# Patient Record
Sex: MALE | Race: WHITE | NOT HISPANIC OR LATINO
[De-identification: names, ages, dates, MRNs, and addresses within clinical notes are randomized per-mention and may not be internally consistent; named-entity substitution may affect disease eponyms.]

---

## 2020-05-21 ENCOUNTER — TRANSCRIPTION ENCOUNTER (OUTPATIENT)
Age: 55
End: 2020-05-21

## 2020-08-15 ENCOUNTER — TRANSCRIPTION ENCOUNTER (OUTPATIENT)
Age: 55
End: 2020-08-15

## 2020-12-01 ENCOUNTER — TRANSCRIPTION ENCOUNTER (OUTPATIENT)
Age: 55
End: 2020-12-01

## 2020-12-01 ENCOUNTER — INPATIENT (INPATIENT)
Facility: HOSPITAL | Age: 55
LOS: 6 days | Discharge: HOME | DRG: 273 | End: 2020-12-08
Attending: INTERNAL MEDICINE | Admitting: INTERNAL MEDICINE
Payer: SELF-PAY

## 2020-12-01 VITALS
OXYGEN SATURATION: 95 % | SYSTOLIC BLOOD PRESSURE: 122 MMHG | TEMPERATURE: 98 F | DIASTOLIC BLOOD PRESSURE: 86 MMHG | HEART RATE: 156 BPM | HEIGHT: 74 IN | WEIGHT: 220.02 LBS | RESPIRATION RATE: 18 BRPM

## 2020-12-01 DIAGNOSIS — I48.91 UNSPECIFIED ATRIAL FIBRILLATION: ICD-10-CM

## 2020-12-01 DIAGNOSIS — R09.89 OTHER SPECIFIED SYMPTOMS AND SIGNS INVOLVING THE CIRCULATORY AND RESPIRATORY SYSTEMS: ICD-10-CM

## 2020-12-01 LAB
ALBUMIN SERPL ELPH-MCNC: 3.7 G/DL — SIGNIFICANT CHANGE UP (ref 3.3–5)
ALP SERPL-CCNC: 57 U/L — SIGNIFICANT CHANGE UP (ref 30–120)
ALT FLD-CCNC: 114 U/L DA — HIGH (ref 10–60)
ANION GAP SERPL CALC-SCNC: 11 MMOL/L — SIGNIFICANT CHANGE UP (ref 5–17)
APTT BLD: 28 SEC — SIGNIFICANT CHANGE UP (ref 27.5–35.5)
AST SERPL-CCNC: 58 U/L — HIGH (ref 10–40)
BASOPHILS # BLD AUTO: 0.09 K/UL — SIGNIFICANT CHANGE UP (ref 0–0.2)
BASOPHILS NFR BLD AUTO: 1.2 % — SIGNIFICANT CHANGE UP (ref 0–2)
BILIRUB SERPL-MCNC: 0.4 MG/DL — SIGNIFICANT CHANGE UP (ref 0.2–1.2)
BUN SERPL-MCNC: 20 MG/DL — SIGNIFICANT CHANGE UP (ref 7–23)
CALCIUM SERPL-MCNC: 8.5 MG/DL — SIGNIFICANT CHANGE UP (ref 8.4–10.5)
CHLORIDE SERPL-SCNC: 109 MMOL/L — HIGH (ref 96–108)
CK SERPL-CCNC: 147 U/L — SIGNIFICANT CHANGE UP (ref 39–308)
CO2 SERPL-SCNC: 22 MMOL/L — SIGNIFICANT CHANGE UP (ref 22–31)
CREAT SERPL-MCNC: 1.3 MG/DL — SIGNIFICANT CHANGE UP (ref 0.5–1.3)
D DIMER BLD IA.RAPID-MCNC: 309 NG/ML DDU — HIGH
EOSINOPHIL # BLD AUTO: 0.12 K/UL — SIGNIFICANT CHANGE UP (ref 0–0.5)
EOSINOPHIL NFR BLD AUTO: 1.6 % — SIGNIFICANT CHANGE UP (ref 0–6)
GLUCOSE SERPL-MCNC: 118 MG/DL — HIGH (ref 70–99)
HCT VFR BLD CALC: 45.9 % — SIGNIFICANT CHANGE UP (ref 39–50)
HGB BLD-MCNC: 15.6 G/DL — SIGNIFICANT CHANGE UP (ref 13–17)
IMM GRANULOCYTES NFR BLD AUTO: 0.3 % — SIGNIFICANT CHANGE UP (ref 0–1.5)
INR BLD: 1.15 RATIO — SIGNIFICANT CHANGE UP (ref 0.88–1.16)
LYMPHOCYTES # BLD AUTO: 2.39 K/UL — SIGNIFICANT CHANGE UP (ref 1–3.3)
LYMPHOCYTES # BLD AUTO: 31.4 % — SIGNIFICANT CHANGE UP (ref 13–44)
MCHC RBC-ENTMCNC: 29.3 PG — SIGNIFICANT CHANGE UP (ref 27–34)
MCHC RBC-ENTMCNC: 34 GM/DL — SIGNIFICANT CHANGE UP (ref 32–36)
MCV RBC AUTO: 86.1 FL — SIGNIFICANT CHANGE UP (ref 80–100)
MONOCYTES # BLD AUTO: 0.69 K/UL — SIGNIFICANT CHANGE UP (ref 0–0.9)
MONOCYTES NFR BLD AUTO: 9.1 % — SIGNIFICANT CHANGE UP (ref 2–14)
NEUTROPHILS # BLD AUTO: 4.3 K/UL — SIGNIFICANT CHANGE UP (ref 1.8–7.4)
NEUTROPHILS NFR BLD AUTO: 56.4 % — SIGNIFICANT CHANGE UP (ref 43–77)
NRBC # BLD: 0 /100 WBCS — SIGNIFICANT CHANGE UP (ref 0–0)
NT-PROBNP SERPL-SCNC: 2026 PG/ML — HIGH (ref 0–125)
PLATELET # BLD AUTO: 232 K/UL — SIGNIFICANT CHANGE UP (ref 150–400)
POTASSIUM SERPL-MCNC: 4.7 MMOL/L — SIGNIFICANT CHANGE UP (ref 3.5–5.3)
POTASSIUM SERPL-SCNC: 4.7 MMOL/L — SIGNIFICANT CHANGE UP (ref 3.5–5.3)
PROT SERPL-MCNC: 7 G/DL — SIGNIFICANT CHANGE UP (ref 6–8.3)
PROTHROM AB SERPL-ACNC: 13.8 SEC — HIGH (ref 10.6–13.6)
RBC # BLD: 5.33 M/UL — SIGNIFICANT CHANGE UP (ref 4.2–5.8)
RBC # FLD: 13.1 % — SIGNIFICANT CHANGE UP (ref 10.3–14.5)
SARS-COV-2 RNA SPEC QL NAA+PROBE: SIGNIFICANT CHANGE UP
SODIUM SERPL-SCNC: 142 MMOL/L — SIGNIFICANT CHANGE UP (ref 135–145)
TROPONIN I SERPL-MCNC: 0.02 NG/ML — SIGNIFICANT CHANGE UP (ref 0.02–0.06)
WBC # BLD: 7.61 K/UL — SIGNIFICANT CHANGE UP (ref 3.8–10.5)
WBC # FLD AUTO: 7.61 K/UL — SIGNIFICANT CHANGE UP (ref 3.8–10.5)

## 2020-12-01 PROCEDURE — 93010 ELECTROCARDIOGRAM REPORT: CPT

## 2020-12-01 PROCEDURE — 99285 EMERGENCY DEPT VISIT HI MDM: CPT

## 2020-12-01 PROCEDURE — 93306 TTE W/DOPPLER COMPLETE: CPT | Mod: 26

## 2020-12-01 PROCEDURE — 71045 X-RAY EXAM CHEST 1 VIEW: CPT | Mod: 26

## 2020-12-01 PROCEDURE — 71275 CT ANGIOGRAPHY CHEST: CPT | Mod: 26

## 2020-12-01 RX ORDER — FUROSEMIDE 40 MG
40 TABLET ORAL ONCE
Refills: 0 | Status: COMPLETED | OUTPATIENT
Start: 2020-12-01 | End: 2020-12-01

## 2020-12-01 RX ORDER — METOPROLOL TARTRATE 50 MG
25 TABLET ORAL EVERY 6 HOURS
Refills: 0 | Status: DISCONTINUED | OUTPATIENT
Start: 2020-12-01 | End: 2020-12-02

## 2020-12-01 RX ORDER — DILTIAZEM HCL 120 MG
20 CAPSULE, EXT RELEASE 24 HR ORAL ONCE
Refills: 0 | Status: COMPLETED | OUTPATIENT
Start: 2020-12-01 | End: 2020-12-01

## 2020-12-01 RX ORDER — ENOXAPARIN SODIUM 100 MG/ML
100 INJECTION SUBCUTANEOUS EVERY 12 HOURS
Refills: 0 | Status: DISCONTINUED | OUTPATIENT
Start: 2020-12-02 | End: 2020-12-02

## 2020-12-01 RX ORDER — DILTIAZEM HCL 120 MG
10 CAPSULE, EXT RELEASE 24 HR ORAL
Qty: 125 | Refills: 0 | Status: DISCONTINUED | OUTPATIENT
Start: 2020-12-01 | End: 2020-12-01

## 2020-12-01 RX ORDER — DILTIAZEM HCL 120 MG
60 CAPSULE, EXT RELEASE 24 HR ORAL EVERY 6 HOURS
Refills: 0 | Status: DISCONTINUED | OUTPATIENT
Start: 2020-12-01 | End: 2020-12-01

## 2020-12-01 RX ORDER — INFLUENZA VIRUS VACCINE 15; 15; 15; 15 UG/.5ML; UG/.5ML; UG/.5ML; UG/.5ML
0.5 SUSPENSION INTRAMUSCULAR ONCE
Refills: 0 | Status: DISCONTINUED | OUTPATIENT
Start: 2020-12-01 | End: 2020-12-02

## 2020-12-01 RX ORDER — ENOXAPARIN SODIUM 100 MG/ML
40 INJECTION SUBCUTANEOUS DAILY
Refills: 0 | Status: DISCONTINUED | OUTPATIENT
Start: 2020-12-01 | End: 2020-12-01

## 2020-12-01 RX ORDER — SODIUM CHLORIDE 9 MG/ML
1000 INJECTION INTRAMUSCULAR; INTRAVENOUS; SUBCUTANEOUS ONCE
Refills: 0 | Status: COMPLETED | OUTPATIENT
Start: 2020-12-01 | End: 2020-12-01

## 2020-12-01 RX ORDER — ENOXAPARIN SODIUM 100 MG/ML
100 INJECTION SUBCUTANEOUS ONCE
Refills: 0 | Status: COMPLETED | OUTPATIENT
Start: 2020-12-01 | End: 2020-12-01

## 2020-12-01 RX ORDER — DILTIAZEM HCL 120 MG
10 CAPSULE, EXT RELEASE 24 HR ORAL ONCE
Refills: 0 | Status: COMPLETED | OUTPATIENT
Start: 2020-12-01 | End: 2020-12-01

## 2020-12-01 RX ORDER — LANOLIN ALCOHOL/MO/W.PET/CERES
5 CREAM (GRAM) TOPICAL ONCE
Refills: 0 | Status: COMPLETED | OUTPATIENT
Start: 2020-12-01 | End: 2020-12-01

## 2020-12-01 RX ORDER — DILTIAZEM HCL 120 MG
10 CAPSULE, EXT RELEASE 24 HR ORAL
Qty: 125 | Refills: 0 | Status: DISCONTINUED | OUTPATIENT
Start: 2020-12-01 | End: 2020-12-02

## 2020-12-01 RX ADMIN — Medication 10 MG/HR: at 16:56

## 2020-12-01 RX ADMIN — SODIUM CHLORIDE 1000 MILLILITER(S): 9 INJECTION INTRAMUSCULAR; INTRAVENOUS; SUBCUTANEOUS at 17:09

## 2020-12-01 RX ADMIN — Medication 40 MILLIGRAM(S): at 17:41

## 2020-12-01 RX ADMIN — ENOXAPARIN SODIUM 100 MILLIGRAM(S): 100 INJECTION SUBCUTANEOUS at 17:19

## 2020-12-01 RX ADMIN — Medication 5 MILLIGRAM(S): at 21:31

## 2020-12-01 RX ADMIN — Medication 20 MILLIGRAM(S): at 16:49

## 2020-12-01 RX ADMIN — Medication 60 MILLIGRAM(S): at 17:18

## 2020-12-01 RX ADMIN — Medication 10 MILLIGRAM(S): at 16:25

## 2020-12-01 RX ADMIN — SODIUM CHLORIDE 1000 MILLILITER(S): 9 INJECTION INTRAMUSCULAR; INTRAVENOUS; SUBCUTANEOUS at 17:23

## 2020-12-01 RX ADMIN — Medication 10 MG/HR: at 17:27

## 2020-12-01 RX ADMIN — Medication 10 MG/HR: at 17:25

## 2020-12-01 NOTE — ED ADULT NURSE NOTE - CHPI ED NUR SYMPTOMS NEG
no fever/no chest pain/no nausea/no dizziness/no vomiting/no syncope/no back pain/no chills/no diaphoresis/no congestion

## 2020-12-01 NOTE — ED PROVIDER NOTE - PROGRESS NOTE DETAILS
Dw Dr CARMEN Camacho - will see pt to admit, pending covid Dw Dr OCTAVIA Camacho - pt no longer sees Dr Calderon - admit to on-call md Pt seen by Dr ZEENAT Taylor - giving Lovenox, admit to tele Bruce ruiz - agree with lasix for CXR findings and borderline o2 sat. Will check CTA.   Bruce Block- will see pt to admit, pending covid Bruce Block- will see pt to admit (CTA res, covid neg). Bruce Taylor re all results as well

## 2020-12-01 NOTE — ED ADULT NURSE REASSESSMENT NOTE - NS ED NURSE REASSESS COMMENT FT1
telephone report given to DANIEL Rodriguez on 1E
received report and assumed care of pt at change of shift. pt awake and alert. denies pain and discomfort presently. cm='s - 140's. pt maintained on Cardizem drip as ordered. call placed to Cardiologist Dr. Taylor; informed of heart rate. no new orders; continue treatment; admit to tele. voiding large amounts yellow urine in urinal. will continue to monitor

## 2020-12-01 NOTE — CONSULT NOTE ADULT - ASSESSMENT
The patient is a 55 year old male with no past medical history who presents with shortness of breath in the setting of rapid atrial fibrillation.    Plan:  - Received a total of 30 mg IV of diltiazem  - Heart rates improved but still remains tachycardic 100-120  - Start diltiazem drip at 10 mg/hr  - Start diltiazem 60 mg PO q6h  - [id number] of 0 so will likely not need long term anticoagulation. Will give a one time dose of enoxaparin 100 mg until more data is obtained.  - Check TSH in am  - Check echocardiogram  - Monitor on telemetry The patient is a 55 year old male with no past medical history who presents with shortness of breath in the setting of rapid atrial fibrillation.    Plan:  - Received a total of 30 mg IV of diltiazem  - Heart rates improved but still remains tachycardic 100-120  - Start diltiazem drip at 10 mg/hr  - Start diltiazem 60 mg PO q6h  - [id number] of 0 so will likely not need long term anticoagulation. Will give a one time dose of enoxaparin 100 mg until more data is obtained.  - Check TSH in am  - Check echocardiogram  - D-dimer mildly elevated; check CTA chest  - CXR with pulm edema  - Hold additional IV fluids  - Check BNP  - Cardiac enzymes negative  - May need diuresis  - Monitor on telemetry The patient is a 55 year old male with no past medical history who presents with shortness of breath in the setting of rapid atrial fibrillation.    Plan:  - Received a total of 30 mg IV of diltiazem  - Heart rates improved but still remains tachycardic 100-120  - Start diltiazem drip at 10 mg/hr  - Start diltiazem 60 mg PO q6h  - [id number] of 0 so will likely not need long term anticoagulation. Will give a one time dose of enoxaparin 100 mg until more data is obtained.  - Check TSH in am  - Check echocardiogram  - D-dimer mildly elevated; check CTA chest  - CXR with pulm edema  - Check BNP  - Cardiac enzymes negative  - Give furosemide 40 mg IV now and assess in am the need for additional diuretics  - Monitor on telemetry

## 2020-12-01 NOTE — CONSULT NOTE ADULT - SUBJECTIVE AND OBJECTIVE BOX
History of Present Illness: The patient is a 55 year old male with no past medical history who presents with shortness of breath. He states for at least the past week, he has had shortness of breath, especially when lying down. It is not as noticeable when he is exerting himself. He has also noted bloating and nausea when lying down. He denies significant palpitations, dizziness, or chest pain. His alcohol and caffeine intake has increased over the past few months. No prior cardiac testing. He initially when to urgent care where he received adenosine 6 mg, 12 mg, and 12 mg without effect.    Past Medical/Surgical History:  None    Medications:  None    Family History: Non-contributory family history of premature cardiovascular atherosclerotic disease    Social History: See HPI    Review of Systems:  General: No fevers, chills, weight loss or gain  Skin: No rashes, color changes  Cardiovascular: No chest pain, orthopnea  Respiratory: No shortness of breath, cough  Gastrointestinal: (+) nausea, abdominal pain  Genitourinary: No incontinence, pain with urination  Musculoskeletal: No pain, swelling, decreased range of motion  Neurological: No headache, weakness  Psychiatric: No depression, anxiety  Endocrine: No weight loss or gain, increased thirst  All other systems are comprehensively negative.    Physical Exam:  Vitals:        Vital Signs Last 24 Hrs  T(C): 36.9 (01 Dec 2020 16:20), Max: 36.9 (01 Dec 2020 16:20)  T(F): 98.5 (01 Dec 2020 16:20), Max: 98.5 (01 Dec 2020 16:20)  HR: 109 (01 Dec 2020 17:05) (109 - 156)  BP: 147/111 (01 Dec 2020 17:05) (111/86 - 147/111)  BP(mean): --  RR: 93 (01 Dec 2020 16:36) (18 - 93)  SpO2: 95% (01 Dec 2020 16:20) (95% - 95%)  General: NAD  HEENT: MMM  Neck: No JVD, no carotid bruit  Lungs: CTAB  CV: Irregular, nl S1/S2, no M/R/G  Abdomen: S/NT/ND, +BS  Extremities: No LE edema, no cyanosis  Neuro: AAOx3, non-focal  Skin: No rash    Labs:                        15.6   7.61  )-----------( 232      ( 01 Dec 2020 16:44 )             45.9     12-01    142  |  109<H>  |  20  ----------------------------<  118<H>  4.7   |  22  |  1.30    Ca    8.5      01 Dec 2020 16:44          PT/INR - ( 01 Dec 2020 16:44 )   PT: 13.8 sec;   INR: 1.15 ratio         PTT - ( 01 Dec 2020 16:44 )  PTT:28.0 sec    ECG: AF, normal axis, nonspecific ST abnormality

## 2020-12-01 NOTE — ED PROVIDER NOTE - CARE PLAN
Principal Discharge DX:	New onset atrial fibrillation   Principal Discharge DX:	New onset atrial fibrillation  Secondary Diagnosis:	Pulmonary congestion

## 2020-12-01 NOTE — ED PROVIDER NOTE - OBJECTIVE STATEMENT
54 y/o male with no pertinent PMHx presents to the ED BIBEMS from Urgent Care c/o SOB x few days. Pt states he started having difficulty breathing on 11/30 night with associated nausea. Pt went to Urgent Care today, EKG showed to have rapid heart beat. Per EMS, urgent care treated pt as SVT, pt given 6-12-12 Adenosine en route to [facility] ED. Pt denies palpitations, coughs, congestion, fevers, peripheral edema. No known COVID contacts, pt does not smoke or take any illicit drugs. No other complaints at this time. NKDA. PCP: Thomas Calderon 54 y/o male with no pertinent PMHx presents to the ED BIBEMS from Urgent Care c/o SOB x few days. Pt states he started having difficulty breathing on 11/30 night.  no chest pains. Pt with persistent mild dyspnea x past ~ 8d. Pt went to Urgent Care today, EKG showed to have rapid heart beat. Per EMS, urgent care treated pt as SVT, pt given 6-12-12 Adenosine with no resolutoin of sx. Pt with  mild palpitations last 2 d. NO cough , congestion, fevers, peripheral edema. No known COVID contacts / prior infxn . pt does not smoke or take any illicit drugs. No other complaints at this time. NKDA. PCP: Thomas Calderon (last seen 8 yrs ago) 56 y/o male with no pertinent PMHx presents to the ED BIBEMS from Urgent Care c/o SOB x few days. Pt states he started having difficulty breathing ~ 11/23 night.  no chest pains. Pt with persistent mild dyspnea x past ~ 8d. Pt went to Urgent Care today, EKG showed to have rapid heart beat. Per EMS, urgent care treated pt as SVT, pt given 6-12-12 Adenosine with no resolutoin of sx. Pt with  mild palpitations last 2 d. NO cough , congestion, fevers, peripheral edema. No known COVID contacts / prior infxn . pt does not smoke or take any illicit drugs. No other complaints at this time. NKDA. PCP: Thomas Calderon (last seen 8 yrs ago)

## 2020-12-01 NOTE — H&P ADULT - HISTORY OF PRESENT ILLNESS
56 y/o male with no pertinent PMHx presents to the ED BIBEMS from Urgent Care c/o SOB x few days. Pt states he started having difficulty breathing ~ 11/23 night.  no chest pains. Pt with persistent mild dyspnea x past ~ 8d. Pt went to Urgent Care today, EKG showed to have rapid heart beat. Per EMS, urgent care treated pt as SVT, pt given 6-12-12 Adenosine with no resolutoin of sx. Pt with  mild palpitations last 2 d. NO cough , congestion, fevers, peripheral edema.he says for last 1 year he had irregular heart beat at times but sought no TT for it no w up done. No known COVID contacts / prior infxn . pt does not smoke or take any illicit drugs. No other complaints at this time. NKDA. PCP: Kvng gunter last seen 8 yrs back  patient states that lately he has started drinking alcohol a lot and takes lot of coffee. lives alone, has a business importing flowers, has one daughter who lives in city

## 2020-12-01 NOTE — ED ADULT TRIAGE NOTE - CHIEF COMPLAINT QUOTE
Ptient brought in by EMS from urgent care for shortness of breath for a few days. urgent care treated rapid heartrate as SVT and gave 6mg adenosine patient's HR went from 150s to 120s. Patient's HR went back up to 150s and urgent care gave a second dose of 12mg adenosine. EMS gave 3rd dose of 12mg adenosine in route to ED. Patient arrived with  direct bedded to trauma room for EKG and evaluation.

## 2020-12-01 NOTE — H&P ADULT - ASSESSMENT
56 y/o male with no pertinent PMHx presents to the ED BIBEMS from Urgent Care c/o SOB x few days. Pt states he started having difficulty breathing ~ 11/23 night.  no chest pains. Pt with persistent mild dyspnea x past ~ 8d. Pt went to Urgent Care today, EKG showed to have rapid heart beat. Per EMS, urgent care treated pt as SVT, pt given 6-12-12 Adenosine with no resolutoin of sx. Pt with  mild palpitations last 2 d. NO cough , congestion, fevers, peripheral edema.he says for last 1 year he had irregular heart beat at times but sought no TT for it no w up done. No known COVID contacts / prior infxn . pt does not smoke or take any illicit drugs. No other complaints at this time. NKDA. PCP: Kvng gunter last seen 8 yrs back  patient states that lately he has started drinking alcohol a lot and takes lot of coffee. lives alone, has a business importing flowers, has one daughter who lives in city  admitted for  ac respiratory distress   AFIB with RVR  chf  transaminitis  started on cardizem, cardio consult noted-tart diltiazem 60 mg PO q6h  - [id number] of 0 so will likely not need long term anticoagulation. Will give a one time dose of enoxaparin 100 mg until more data is obtained. D-dimer mildly elevated; check CTA chest-Neg for PE, b/l effusion with pulmonary edema  - CXR with pulm edema received lasix in ED

## 2020-12-01 NOTE — ED PROVIDER NOTE - CARDIAC, MLM
Regular rhythm.  Heart sounds S1, S2.  No murmurs, rubs or gallops. +tachycardic irregular rhythm.  Heart sounds S1, S2.  No murmurs, rubs or gallops. +tachycardic

## 2020-12-01 NOTE — ED ADULT NURSE NOTE - OBJECTIVE STATEMENT
Patient is a 55 year old male who came to the ED due to having a rapid heart rate in an urgent care center - patient states that he went to urgent care because he was feeling SOB since Monday.  Patient alert and orientated to person, place, and time; breathing unlabored but quick, patient has difficulty speaking in longer sentences; skin is warm and dry - color is good.

## 2020-12-02 LAB
ALBUMIN SERPL ELPH-MCNC: 3.5 G/DL — SIGNIFICANT CHANGE UP (ref 3.3–5)
ALP SERPL-CCNC: 56 U/L — SIGNIFICANT CHANGE UP (ref 30–120)
ALT FLD-CCNC: 92 U/L DA — HIGH (ref 10–60)
ANION GAP SERPL CALC-SCNC: 11 MMOL/L — SIGNIFICANT CHANGE UP (ref 5–17)
AST SERPL-CCNC: 43 U/L — HIGH (ref 10–40)
BASOPHILS # BLD AUTO: 0.11 K/UL — SIGNIFICANT CHANGE UP (ref 0–0.2)
BASOPHILS NFR BLD AUTO: 1.7 % — SIGNIFICANT CHANGE UP (ref 0–2)
BILIRUB SERPL-MCNC: 0.7 MG/DL — SIGNIFICANT CHANGE UP (ref 0.2–1.2)
BUN SERPL-MCNC: 20 MG/DL — SIGNIFICANT CHANGE UP (ref 7–23)
CALCIUM SERPL-MCNC: 8.6 MG/DL — SIGNIFICANT CHANGE UP (ref 8.4–10.5)
CHLORIDE SERPL-SCNC: 110 MMOL/L — HIGH (ref 96–108)
CHOLEST SERPL-MCNC: 137 MG/DL — SIGNIFICANT CHANGE UP
CO2 SERPL-SCNC: 22 MMOL/L — SIGNIFICANT CHANGE UP (ref 22–31)
CREAT SERPL-MCNC: 0.98 MG/DL — SIGNIFICANT CHANGE UP (ref 0.5–1.3)
EOSINOPHIL # BLD AUTO: 0.13 K/UL — SIGNIFICANT CHANGE UP (ref 0–0.5)
EOSINOPHIL NFR BLD AUTO: 2 % — SIGNIFICANT CHANGE UP (ref 0–6)
GLUCOSE SERPL-MCNC: 111 MG/DL — HIGH (ref 70–99)
HCT VFR BLD CALC: 46.5 % — SIGNIFICANT CHANGE UP (ref 39–50)
HDLC SERPL-MCNC: 44 MG/DL — SIGNIFICANT CHANGE UP
HGB BLD-MCNC: 15.9 G/DL — SIGNIFICANT CHANGE UP (ref 13–17)
IMM GRANULOCYTES NFR BLD AUTO: 0.2 % — SIGNIFICANT CHANGE UP (ref 0–1.5)
LIPID PNL WITH DIRECT LDL SERPL: 77 MG/DL — SIGNIFICANT CHANGE UP
LYMPHOCYTES # BLD AUTO: 1.91 K/UL — SIGNIFICANT CHANGE UP (ref 1–3.3)
LYMPHOCYTES # BLD AUTO: 29 % — SIGNIFICANT CHANGE UP (ref 13–44)
MCHC RBC-ENTMCNC: 29.2 PG — SIGNIFICANT CHANGE UP (ref 27–34)
MCHC RBC-ENTMCNC: 34.2 GM/DL — SIGNIFICANT CHANGE UP (ref 32–36)
MCV RBC AUTO: 85.5 FL — SIGNIFICANT CHANGE UP (ref 80–100)
MONOCYTES # BLD AUTO: 0.65 K/UL — SIGNIFICANT CHANGE UP (ref 0–0.9)
MONOCYTES NFR BLD AUTO: 9.9 % — SIGNIFICANT CHANGE UP (ref 2–14)
NEUTROPHILS # BLD AUTO: 3.77 K/UL — SIGNIFICANT CHANGE UP (ref 1.8–7.4)
NEUTROPHILS NFR BLD AUTO: 57.2 % — SIGNIFICANT CHANGE UP (ref 43–77)
NON HDL CHOLESTEROL: 93 MG/DL — SIGNIFICANT CHANGE UP
NRBC # BLD: 0 /100 WBCS — SIGNIFICANT CHANGE UP (ref 0–0)
NT-PROBNP SERPL-SCNC: 1347 PG/ML — HIGH (ref 0–125)
PLATELET # BLD AUTO: 198 K/UL — SIGNIFICANT CHANGE UP (ref 150–400)
POTASSIUM SERPL-MCNC: 3.8 MMOL/L — SIGNIFICANT CHANGE UP (ref 3.5–5.3)
POTASSIUM SERPL-SCNC: 3.8 MMOL/L — SIGNIFICANT CHANGE UP (ref 3.5–5.3)
PROT SERPL-MCNC: 6.8 G/DL — SIGNIFICANT CHANGE UP (ref 6–8.3)
RBC # BLD: 5.44 M/UL — SIGNIFICANT CHANGE UP (ref 4.2–5.8)
RBC # FLD: 13.1 % — SIGNIFICANT CHANGE UP (ref 10.3–14.5)
SODIUM SERPL-SCNC: 143 MMOL/L — SIGNIFICANT CHANGE UP (ref 135–145)
T3 SERPL-MCNC: 102 NG/DL — SIGNIFICANT CHANGE UP (ref 80–200)
T4 AB SER-ACNC: 6.9 UG/DL — SIGNIFICANT CHANGE UP (ref 4.6–12)
TRIGL SERPL-MCNC: 84 MG/DL — SIGNIFICANT CHANGE UP
TSH SERPL-MCNC: 2.04 UIU/ML — SIGNIFICANT CHANGE UP (ref 0.27–4.2)
TSH SERPL-MCNC: 2.19 UIU/ML — SIGNIFICANT CHANGE UP (ref 0.27–4.2)
WBC # BLD: 6.58 K/UL — SIGNIFICANT CHANGE UP (ref 3.8–10.5)
WBC # FLD AUTO: 6.58 K/UL — SIGNIFICANT CHANGE UP (ref 3.8–10.5)

## 2020-12-02 PROCEDURE — 93010 ELECTROCARDIOGRAM REPORT: CPT

## 2020-12-02 PROCEDURE — 99255 IP/OBS CONSLTJ NEW/EST HI 80: CPT

## 2020-12-02 PROCEDURE — 93456 R HRT CORONARY ARTERY ANGIO: CPT | Mod: 26

## 2020-12-02 PROCEDURE — 99152 MOD SED SAME PHYS/QHP 5/>YRS: CPT

## 2020-12-02 RX ORDER — FUROSEMIDE 40 MG
20 TABLET ORAL DAILY
Refills: 0 | Status: DISCONTINUED | OUTPATIENT
Start: 2020-12-02 | End: 2020-12-08

## 2020-12-02 RX ORDER — METOPROLOL TARTRATE 50 MG
50 TABLET ORAL EVERY 6 HOURS
Refills: 0 | Status: DISCONTINUED | OUTPATIENT
Start: 2020-12-02 | End: 2020-12-02

## 2020-12-02 RX ORDER — METOPROLOL TARTRATE 50 MG
25 TABLET ORAL ONCE
Refills: 0 | Status: COMPLETED | OUTPATIENT
Start: 2020-12-02 | End: 2020-12-02

## 2020-12-02 RX ORDER — METOPROLOL TARTRATE 50 MG
25 TABLET ORAL EVERY 6 HOURS
Refills: 0 | Status: DISCONTINUED | OUTPATIENT
Start: 2020-12-02 | End: 2020-12-07

## 2020-12-02 RX ORDER — ATORVASTATIN CALCIUM 80 MG/1
10 TABLET, FILM COATED ORAL AT BEDTIME
Refills: 0 | Status: DISCONTINUED | OUTPATIENT
Start: 2020-12-02 | End: 2020-12-08

## 2020-12-02 RX ORDER — RIVAROXABAN 15 MG-20MG
20 KIT ORAL
Refills: 0 | Status: DISCONTINUED | OUTPATIENT
Start: 2020-12-02 | End: 2020-12-08

## 2020-12-02 RX ORDER — FUROSEMIDE 40 MG
20 TABLET ORAL DAILY
Refills: 0 | Status: DISCONTINUED | OUTPATIENT
Start: 2020-12-02 | End: 2020-12-02

## 2020-12-02 RX ADMIN — Medication 25 MILLIGRAM(S): at 09:34

## 2020-12-02 RX ADMIN — RIVAROXABAN 20 MILLIGRAM(S): KIT at 18:58

## 2020-12-02 RX ADMIN — Medication 25 MILLIGRAM(S): at 05:39

## 2020-12-02 RX ADMIN — Medication 25 MILLIGRAM(S): at 00:11

## 2020-12-02 RX ADMIN — Medication 20 MILLIGRAM(S): at 09:34

## 2020-12-02 RX ADMIN — Medication 10 MG/HR: at 05:40

## 2020-12-02 RX ADMIN — Medication 50 MILLIGRAM(S): at 17:35

## 2020-12-02 RX ADMIN — ENOXAPARIN SODIUM 100 MILLIGRAM(S): 100 INJECTION SUBCUTANEOUS at 05:39

## 2020-12-02 RX ADMIN — Medication 50 MILLIGRAM(S): at 12:35

## 2020-12-02 RX ADMIN — ATORVASTATIN CALCIUM 10 MILLIGRAM(S): 80 TABLET, FILM COATED ORAL at 21:44

## 2020-12-02 RX ADMIN — Medication 25 MILLIGRAM(S): at 23:26

## 2020-12-02 NOTE — PROGRESS NOTE ADULT - SUBJECTIVE AND OBJECTIVE BOX
Chief Complaint: Shortness of breath    Interval Events: No events overnight. No complaints.    Review of Systems:  General: No fevers, chills, weight loss or gain  Skin: No rashes, color changes  Cardiovascular: No chest pain, orthopnea  Respiratory: No shortness of breath, cough  Gastrointestinal: No nausea, abdominal pain  Genitourinary: No incontinence, pain with urination  Musculoskeletal: No pain, swelling, decreased range of motion  Neurological: No headache, weakness  Psychiatric: No depression, anxiety  Endocrine: No weight loss or gain, increased thirst  All other systems are comprehensively negative.    Physical Exam:  Vitals:        Vital Signs Last 24 Hrs  T(C): 36.3 (02 Dec 2020 07:42), Max: 37.1 (01 Dec 2020 19:44)  T(F): 97.3 (02 Dec 2020 07:42), Max: 98.8 (01 Dec 2020 19:44)  HR: 82 (02 Dec 2020 07:42) (81 - 156)  BP: 117/79 (02 Dec 2020 07:42) (111/86 - 164/88)  BP(mean): --  RR: 18 (02 Dec 2020 07:42) (16 - 93)  SpO2: 93% (02 Dec 2020 07:42) (92% - 96%)  General: NAD  HEENT: MMM  Neck: No JVD, no carotid bruit  Lungs: CTAB  CV: Irregular, nl S1/S2, 2/6 systolic murmur  Abdomen: S/NT/ND, +BS  Extremities: No LE edema, no cyanosis  Neuro: AAOx3, non-focal  Skin: No rash    Labs:                        15.9   6.58  )-----------( 198      ( 02 Dec 2020 07:20 )             46.5     12-02    143  |  110<H>  |  20  ----------------------------<  111<H>  3.8   |  22  |  0.98    Ca    8.6      02 Dec 2020 07:20    TPro  6.8  /  Alb  3.5  /  TBili  0.7  /  DBili  x   /  AST  43<H>  /  ALT  92<H>  /  AlkPhos  56  12-02    CARDIAC MARKERS ( 01 Dec 2020 16:44 )  .017 ng/mL / x     / 147 U/L / x     / x          PT/INR - ( 01 Dec 2020 16:44 )   PT: 13.8 sec;   INR: 1.15 ratio         PTT - ( 01 Dec 2020 16:44 )  PTT:28.0 sec    Telemetry: AF

## 2020-12-02 NOTE — PROGRESS NOTE ADULT - PROBLEM SELECTOR PLAN 1
cardizem stopped cont metoprolol, TSH normal, LV dysfunction , planned for cardiac cath and EP studies at [facility]

## 2020-12-02 NOTE — ASU PATIENT PROFILE, ADULT - NS PRO ABUSE SCREEN AFRAID ANYONE YN
"Chief Complaint   Patient presents with     Hip Pain     Right hip       Initial BP 94/52 (BP Location: Right arm, Patient Position: Sitting, Cuff Size: Adult Regular)  Ht 5' 7\" (1.702 m)  Wt 147 lb (66.7 kg)  BMI 23.02 kg/m2 Estimated body mass index is 23.02 kg/(m^2) as calculated from the following:    Height as of this encounter: 5' 7\" (1.702 m).    Weight as of this encounter: 147 lb (66.7 kg).  Medication Reconciliation: complete    " no

## 2020-12-02 NOTE — PROGRESS NOTE ADULT - SUBJECTIVE AND OBJECTIVE BOX
Patient is a 55y old  Male who presents with a chief complaint of SOB (01 Dec 2020 19:26)      INTERVAL HPI/OVERNIGHT EVENTS:overnight events noted    Home Medications:      MEDICATIONS  (STANDING):  furosemide    Tablet 20 milliGRAM(s) Oral daily  influenza   Vaccine 0.5 milliLiter(s) IntraMuscular once  metoprolol tartrate 50 milliGRAM(s) Oral every 6 hours  metoprolol tartrate 25 milliGRAM(s) Oral once    MEDICATIONS  (PRN):      Allergies    No Known Allergies    Intolerances        REVIEW OF SYSTEMS:  CONSTITUTIONAL: No fever, weight loss, has fatigue  EYES: No eye pain, visual disturbances, or discharge  ENMT:  No difficulty hearing, tinnitus, vertigo; No sinus or throat pain  NECK: No pain or stiffness  BREASTS: No pain, masses, or nipple discharge  RESPIRATORY: No cough, wheezing, chills or hemoptysis; has shortness of breath  CARDIOVASCULAR: No chest pain, palpitations, dizziness, or leg swelling  GASTROINTESTINAL: No abdominal or epigastric pain. No nausea, vomiting, or hematemesis; No diarrhea or constipation. No melena or hematochezia.  GENITOURINARY: No dysuria, frequency, hematuria, or incontinence  NEUROLOGICAL: No headaches, memory loss, loss of strength, numbness, or tremors  SKIN: No itching, burning, rashes, or lesions   LYMPH NODES: No enlarged glands  ENDOCRINE: No heat or cold intolerance; No hair loss  MUSCULOSKELETAL: No joint pain or swelling; No muscle, back, or extremity pain  PSYCHIATRIC: No depression, anxiety, mood swings, or difficulty sleeping  HEME/LYMPH: No easy bruising, or bleeding gums  ALLERGY AND IMMUNOLOGIC: No hives or eczema    Vital Signs Last 24 Hrs  T(C): 36.3 (02 Dec 2020 07:42), Max: 37.1 (01 Dec 2020 19:44)  T(F): 97.3 (02 Dec 2020 07:42), Max: 98.8 (01 Dec 2020 19:44)  HR: 82 (02 Dec 2020 07:42) (81 - 156)  BP: 117/79 (02 Dec 2020 07:42) (111/86 - 164/88)  BP(mean): --  RR: 18 (02 Dec 2020 07:42) (16 - 93)  SpO2: 93% (02 Dec 2020 07:42) (92% - 96%)    PHYSICAL EXAM:  GENERAL: NAD, well-groomed, well-developed  HEAD:  Atraumatic, Normocephalic  EYES: EOMI, PERRLA, conjunctiva and sclera clear  ENMT: Moist mucous membranes,   NECK: Supple, No JVD, Normal thyroid  NERVOUS SYSTEM:  Alert & Oriented X3, Good concentration; Motor Strength 5/5 B/L upper and lower extremities; DTRs 2+ intact and symmetric  CHEST/LUNG: BS decreased at bases  HEART: Irregular rate and rhythm; No murmurs, rubs, or gallops  ABDOMEN: Soft, Nontender, Nondistended; Bowel sounds present  EXTREMITIES:  2+ Peripheral Pulses, No clubbing, cyanosis, or edema  LYMPH: No lymphadenopathy noted  SKIN: No rashes or lesions    LABS:                        15.9   6.58  )-----------( 198      ( 02 Dec 2020 07:20 )             46.5     12-02    143  |  110<H>  |  20  ----------------------------<  111<H>  3.8   |  22  |  0.98    Ca    8.6      02 Dec 2020 07:20    TPro  6.8  /  Alb  3.5  /  TBili  0.7  /  DBili  x   /  AST  43<H>  /  ALT  92<H>  /  AlkPhos  56  12-02    PT/INR - ( 01 Dec 2020 16:44 )   PT: 13.8 sec;   INR: 1.15 ratio         PTT - ( 01 Dec 2020 16:44 )  PTT:28.0 sec    CAPILLARY BLOOD GLUCOSE              I&O's Summary    01 Dec 2020 07:01  -  02 Dec 2020 07:00  --------------------------------------------------------  IN: 110 mL / OUT: 700 mL / NET: -590 mL    < from: CT Angio Chest w/ IV Cont (12.01.20 @ 18:35) >    IMPRESSION:  Negative for pulmonary embolism.  Small bilateral pleural effusions.  Septal thickening and groundglass opacities, compatible with pulmonary edema.        < end of copied text >      RADIOLOGY & ADDITIONAL TESTS:    Imaging Personally Reviewed:  [x ] YES  [ ] NO    Consultant(s) Notes Reviewed:  [ x] YES  [ ] NO    Care Discussed with Consultants/Other Providers [x ] YES  [ ] NO

## 2020-12-02 NOTE — PROGRESS NOTE ADULT - ASSESSMENT
The patient is a 55 year old male with no past medical history who presents with shortness of breath in the setting of rapid atrial fibrillation.    Plan:  - Discontinue diltiazem drip  - Increase metoprolol tartrate to 50 mg q6h  - Hold enoxaparin for possible cardiac catheterization  - TSH normal  - Echocardiogram with severe LV systolic dysfunction, mod/severe MR, mild pulm HTN. In some views, there appears to be segmental wall motion abnormalities.  - CTA chest with no PE, small pleural effusions  - Received furosemide 20 mg IV; continue furosemide 20 mg PO daily  - Plan for transfer to [facility] for cardiac catheterization. EP to see as well.

## 2020-12-02 NOTE — H&P CARDIOLOGY - HISTORY OF PRESENT ILLNESS
54 y/o male  (no implantable devices) with no pertinent PMHx presented to [facility] ED from Urgent Care by EMS c/o SOB x 1 week. Pt states he started having difficulty breathing while at rest. While in transfer he received adenosine with no effect. In the ED was found to have new onset Afib w/ RVR with max HR at 150. CTA was done to r/o PE- D-Dimer was mildly elevated;  and found small B/L pleural effusions. He was started on Cardizem, had ECHO performed -severe LV systolic dysfunction, mod/severe MR, mild pulm HTN. In some views, there appears to be segmental wall motion abnormalities. Cardizem was stopped this morning, and started on metoprolol. He transferred to [facility] today for right and left heart cath. He currently endorses mild SOB, and denies any other complaints.        AFIB with RVR  chf  transaminitis  started on cardizem, cardio consult noted-tart diltiazem 60 mg PO q6h  - [id number] of 0 so will likely not need long term anticoagulation. Will give a one time dose of enoxaparin 100 mg until more data is obtained. D-dimer mildly elevated; check CTA chest-Neg for PE, b/l effusion with pulmonary edema  - CXR with pulm edema received lasix in ED 54 y/o male  (no implantable devices) with no pertinent PMHx presented to [facility] ED from Urgent Care by EMS c/o SOB x 1 week. Pt states he started having difficulty breathing while at rest. EMS gave adenosine with no effect. In the ED was found to have new onset Afib w/ RVR with max HR at 150. CTA was done to r/o PE- D-Dimer was mildly elevated;  and found small B/L pleural effusions. He was started on Cardizem, had ECHO performed -severe LV systolic dysfunction, mod/severe MR, mild pulm HTN. In some views, there appears to be segmental wall motion abnormalities. Cardizem was stopped this morning, and started on metoprolol. He transferred to [facility] today for right and left heart cath. He currently endorses mild SOB, and denies any other complaints.

## 2020-12-02 NOTE — CONSULT NOTE ADULT - CONSULT REASON
New onset Atrial Fibrillation with RVR  New onset acute systolic heart failure, severe LV systolic dysfunction  Moderate/Severe MR
Rapid atrial fibrillation

## 2020-12-02 NOTE — CONSULT NOTE ADULT - ASSESSMENT
54 y/o male with no pertinent PMHx presented to [facility] ED from Urgent Care by EMS c/o SOB x 1 week. In the ED was found to have new onset Afib w/ RVR up to 150bpm. CT with small b/l pleural effusions.,TTE revealed severe LV systolic dysfunction, mod/severe MR, mild pulm HTN as well as segmental wall motion abnormalities. Patient was initially on a cardizem gtt, discontinued on 12/2 and started on metoprolol. He transferred to [facility] today for right and left heart cath.      1. New onset Atrial Fibrillation with RVR  2. Severe LV systolic dysfunction, mod/severe MR  3. B/l pleural effusions   56 y/o male with no pertinent PMHx presented to [facility] ED from Urgent Care by EMS c/o SOB x 1 week. In the ED was found to have new onset Afib w/ RVR up to 150bpm. CT with small b/l pleural effusions. TTE revealed severe LV systolic dysfunction, mod/severe MR, mild pulm HTN as well as segmental wall motion abnormalities. Pt endorses increase in alcohol use over the past year as well as increased caffeine use. Patient was initially on a cardizem gtt, discontinued on 12/2 and started on metoprolol. He transferred to [facility] today for right and left heart cath.      1. New onset Atrial Fibrillation with RVR  -Continue to monitor on telemetry  -Keep K >4 and Mg >2  -[id number] Score: 2  -Start Metoprolol tartrate 25mg q6h  -Start Xarelto 20mg qd  -Keep NPO after MN for [facility]/DCCV tomorrow 12/3  -Hold Metoprolol dose prior to DCCV on 12/3  -Could consider anti-arrhythmic post-cardioversion    2. Reportedly severe LV systolic dysfunction, mod/severe MR  -Follow up [facility] report tomorrow    3. B/l pleural effusions  -Lasix 20mg daily    Hayley Smith PA-C  #[id number]

## 2020-12-02 NOTE — CONSULT NOTE ADULT - SUBJECTIVE AND OBJECTIVE BOX
CHIEF COMPLAINT:    HISTORY OF PRESENT ILLNESS: 54 y/o male with no pertinent PMHx presented to [place] ED from Urgent Care by EMS c/o SOB x 1 week. In the ED was found to have new onset Afib w/ RVR up to 150bpm. CT with small b/l pleural effusions.,TTE revealed severe LV systolic dysfunction, mod/severe MR, mild pulm HTN as well as segmental wall motion abnormalities. Patient was initially on a cardizem gtt, discontinued on 12/2 and started on metoprolol. He transferred to [facility] today for right and left heart cath.        Allergies  No Known Allergies  Intolerances    	    MEDICATIONS:                  PAST MEDICAL & SURGICAL HISTORY:  No pertinent past medical history  No significant past surgical history        FAMILY HISTORY:  No pertinent family history in first degree relatives      REVIEW OF SYSTEMS:  See HPI. Otherwise, 10 point ROS done and otherwise negative.    PHYSICAL EXAM:  T(C): 36.8 (12-02-20 @ 14:10), Max: 37.2 (12-02-20 @ 14:01)  HR: 122 (12-02-20 @ 14:10) (81 - 156)  BP: 109/89 (12-02-20 @ 14:10) (100/59 - 164/88)  RR: 20 (12-02-20 @ 14:10) (16 - 93)  SpO2: 95% (12-02-20 @ 14:10) (92% - 96%)  Wt(kg): --  I&O's Summary    01 Dec 2020 07:01  -  02 Dec 2020 07:00  --------------------------------------------------------  IN: 110 mL / OUT: 700 mL / NET: -590 mL    02 Dec 2020 07:01  -  02 Dec 2020 16:17  --------------------------------------------------------  IN: 15 mL / OUT: 0 mL / NET: 15 mL        Appearance:  HEENT:  Cardiovascular:  Respiratory:  Psychiatry:   Gastrointestinal:   Skin:   Neurologic:  Extremities:   Vascular:    LABS:	 	    CBC Full  -  ( 02 Dec 2020 07:20 )  WBC Count : 6.58 K/uL  Hemoglobin : 15.9 g/dL  Hematocrit : 46.5 %  Platelet Count - Automated : 198 K/uL  Mean Cell Volume : 85.5 fl  Mean Cell Hemoglobin : 29.2 pg  Mean Cell Hemoglobin Concentration : 34.2 gm/dL  Auto Neutrophil # : 3.77 K/uL  Auto Lymphocyte # : 1.91 K/uL  Auto Monocyte # : 0.65 K/uL  Auto Eosinophil # : 0.13 K/uL  Auto Basophil # : 0.11 K/uL  Auto Neutrophil % : 57.2 %  Auto Lymphocyte % : 29.0 %  Auto Monocyte % : 9.9 %  Auto Eosinophil % : 2.0 %  Auto Basophil % : 1.7 %    12-02    143  |  110<H>  |  20  ----------------------------<  111<H>  3.8   |  22  |  0.98  12-01    142  |  109<H>  |  20  ----------------------------<  118<H>  4.7   |  22  |  1.30    Ca    8.6      02 Dec 2020 07:20  Ca    8.5      01 Dec 2020 16:44    TPro  6.8  /  Alb  3.5  /  TBili  0.7  /  DBili  x   /  AST  43<H>  /  ALT  92<H>  /  AlkPhos  56  12-02  TPro  7.0  /  Alb  3.7  /  TBili  0.4  /  DBili  x   /  AST  58<H>  /  ALT  114<H>  /  AlkPhos  57  12-01      proBNP: Serum Pro-Brain Natriuretic Peptide: 1347 pg/mL (12-02 @ 07:20)  Serum Pro-Brain Natriuretic Peptide: 2026 pg/mL (12-01 @ 17:26)    Lipid Profile:   HgA1c:   TSH: Thyroid Stimulating Hormone, Serum: 2.04 uIU/mL (12-02 @ 13:47)  Thyroid Stimulating Hormone, Serum: 2.19 uIU/mL (12-02 @ 01:38)        CARDIAC MARKERS:    Troponin I, Serum: .017 ng/mL (12-01-20 @ 16:44)  Creatine Kinase, Serum: 147 U/L (12-01-20 @ 16:44)      TELEMETRY: 	    ECG:  	  RADIOLOGY:    < from: CT Angio Chest w/ IV Cont (12.01.20 @ 18:35) >  EXAM:  CT ANGIO CHEST (W)AW IC                            PROCEDURE DATE:  12/01/2020    INTERPRETATION:  CLINICAL INFORMATION: New onset atrial fibrillation  COMPARISON: None.    PROCEDURE:  CT Angiography of the Chest.  92 ml of Omnipaque 350 was injected intravenously. 8 ml were discarded.  Sagittal and coronal reformats were performed as well as 3D (MIP) reconstructions.    FINDINGS:    LUNGS AND AIRWAYS: Patent central airways.  Septal thickening and groundglass opacity.  PLEURA: Small bilateral pleural effusions.  MEDIASTINUM AND TD: Prominent lymph nodes.  VESSELS: Within normal limits.  HEART: Enlarged. No pericardial effusion.  CHEST WALL AND LOWER NECK: Within normal limits.  VISUALIZED UPPER ABDOMEN: Nonspecific perinephric stranding and fluid.  BONES: Degenerative changes.    IMPRESSION:  Negative for pulmonary embolism.  Small bilateral pleural effusions.  Septal thickening and groundglass opacities, compatible with pulmonary edema.      ROLLY TREJO; Attending Radiologist  This document has been electronically signed. Dec  1 2020  6:47PM    < end of copied text >    	    PREVIOUS DIAGNOSTIC TESTING:    Echocardiogram:    Catheterization:       CHIEF COMPLAINT:    HISTORY OF PRESENT ILLNESS: 54 y/o male with no pertinent PMHx presented to [place] ED from Urgent Care by EMS c/o SOB x 1 week. In the ED was found to have new onset Afib w/ RVR up to 150bpm. CT with small b/l pleural effusions.,TTE revealed severe LV systolic dysfunction, mod/severe MR, mild pulm HTN as well as segmental wall motion abnormalities. Pt endorses increase in alcohol use over the past year as well as increased caffeine use. Patient was initially on a cardizem gtt, discontinued on 12/2 and started on metoprolol. He transferred to [facility] today for right and left heart cath.        Allergies  No Known Allergies  Intolerances    Social History:  Increased alcohol intake over past year, 5 days per week - tequila or vodka  Increased caffeine use  	    MEDICATIONS:                  PAST MEDICAL & SURGICAL HISTORY:  No pertinent past medical history  No significant past surgical history        FAMILY HISTORY:  No pertinent family history in first degree relatives      REVIEW OF SYSTEMS:  See HPI. Otherwise, 10 point ROS done and otherwise negative.    PHYSICAL EXAM:  T(C): 36.8 (12-02-20 @ 14:10), Max: 37.2 (12-02-20 @ 14:01)  HR: 122 (12-02-20 @ 14:10) (81 - 156)  BP: 109/89 (12-02-20 @ 14:10) (100/59 - 164/88)  RR: 20 (12-02-20 @ 14:10) (16 - 93)  SpO2: 95% (12-02-20 @ 14:10) (92% - 96%)  Wt(kg): --  I&O's Summary    01 Dec 2020 07:01  -  02 Dec 2020 07:00  --------------------------------------------------------  IN: 110 mL / OUT: 700 mL / NET: -590 mL    02 Dec 2020 07:01  -  02 Dec 2020 16:17  --------------------------------------------------------  IN: 15 mL / OUT: 0 mL / NET: 15 mL        Appearance: Alert, NAD. A&Ox4  Cardiovascular: Irregularly irregular rhyhtm, tachycardia  Respiratory: Anterior and lateral fields CTA b/l (pt currently on bedrest, unable to sit up)  Extremities: No edema BLE  Vascular: 2+ peripheral pulses    LABS:	 	    CBC Full  -  ( 02 Dec 2020 07:20 )  WBC Count : 6.58 K/uL  Hemoglobin : 15.9 g/dL  Hematocrit : 46.5 %  Platelet Count - Automated : 198 K/uL  Mean Cell Volume : 85.5 fl  Mean Cell Hemoglobin : 29.2 pg  Mean Cell Hemoglobin Concentration : 34.2 gm/dL  Auto Neutrophil # : 3.77 K/uL  Auto Lymphocyte # : 1.91 K/uL  Auto Monocyte # : 0.65 K/uL  Auto Eosinophil # : 0.13 K/uL  Auto Basophil # : 0.11 K/uL  Auto Neutrophil % : 57.2 %  Auto Lymphocyte % : 29.0 %  Auto Monocyte % : 9.9 %  Auto Eosinophil % : 2.0 %  Auto Basophil % : 1.7 %    12-02    143  |  110<H>  |  20  ----------------------------<  111<H>  3.8   |  22  |  0.98  12-01    142  |  109<H>  |  20  ----------------------------<  118<H>  4.7   |  22  |  1.30    Ca    8.6      02 Dec 2020 07:20  Ca    8.5      01 Dec 2020 16:44    TPro  6.8  /  Alb  3.5  /  TBili  0.7  /  DBili  x   /  AST  43<H>  /  ALT  92<H>  /  AlkPhos  56  12-02  TPro  7.0  /  Alb  3.7  /  TBili  0.4  /  DBili  x   /  AST  58<H>  /  ALT  114<H>  /  AlkPhos  57  12-01      proBNP: Serum Pro-Brain Natriuretic Peptide: 1347 pg/mL (12-02 @ 07:20)  Serum Pro-Brain Natriuretic Peptide: 2026 pg/mL (12-01 @ 17:26)    Lipid Profile:   HgA1c:   TSH: Thyroid Stimulating Hormone, Serum: 2.04 uIU/mL (12-02 @ 13:47)  Thyroid Stimulating Hormone, Serum: 2.19 uIU/mL (12-02 @ 01:38)        CARDIAC MARKERS:    Troponin I, Serum: .017 ng/mL (12-01-20 @ 16:44)  Creatine Kinase, Serum: 147 U/L (12-01-20 @ 16:44)      TELEMETRY: 	    ECG:  	  RADIOLOGY:    < from: CT Angio Chest w/ IV Cont (12.01.20 @ 18:35) >  EXAM:  CT ANGIO CHEST (W)AW IC                            PROCEDURE DATE:  12/01/2020    INTERPRETATION:  CLINICAL INFORMATION: New onset atrial fibrillation  COMPARISON: None.    PROCEDURE:  CT Angiography of the Chest.  92 ml of Omnipaque 350 was injected intravenously. 8 ml were discarded.  Sagittal and coronal reformats were performed as well as 3D (MIP) reconstructions.    FINDINGS:    LUNGS AND AIRWAYS: Patent central airways.  Septal thickening and groundglass opacity.  PLEURA: Small bilateral pleural effusions.  MEDIASTINUM AND TD: Prominent lymph nodes.  VESSELS: Within normal limits.  HEART: Enlarged. No pericardial effusion.  CHEST WALL AND LOWER NECK: Within normal limits.  VISUALIZED UPPER ABDOMEN: Nonspecific perinephric stranding and fluid.  BONES: Degenerative changes.    IMPRESSION:  Negative for pulmonary embolism.  Small bilateral pleural effusions.  Septal thickening and groundglass opacities, compatible with pulmonary edema.      ROLLY TREJO; Attending Radiologist  This document has been electronically signed. Dec  1 2020  6:47PM    < end of copied text >    	    PREVIOUS DIAGNOSTIC TESTING:    Echocardiogram:    Catheterization:       CHIEF COMPLAINT:    HISTORY OF PRESENT ILLNESS: 54 y/o male with no pertinent PMHx presented to [place] ED from Urgent Care by EMS c/o SOB x 1 week. In the ED was found to have new onset Afib w/ RVR up to 150bpm. CT with small b/l pleural effusions.,TTE revealed severe LV systolic dysfunction, mod/severe MR, mild pulm HTN as well as segmental wall motion abnormalities. Pt endorses increase in alcohol use over the past year as well as increased caffeine use. Patient was initially on a cardizem gtt, discontinued on 12/2 and started on metoprolol. He transferred to [facility] today for right and left heart cath.        Allergies  No Known Allergies  Intolerances    Social History:  Increased alcohol intake over past year, 5 days per week - tequila or vodka  Increased caffeine use  	    MEDICATIONS:                  PAST MEDICAL & SURGICAL HISTORY:  No pertinent past medical history  No significant past surgical history        FAMILY HISTORY:  No pertinent family history in first degree relatives      REVIEW OF SYSTEMS:  See HPI. Otherwise, 10 point ROS done and otherwise negative.    PHYSICAL EXAM:  T(C): 36.8 (12-02-20 @ 14:10), Max: 37.2 (12-02-20 @ 14:01)  HR: 122 (12-02-20 @ 14:10) (81 - 156)  BP: 109/89 (12-02-20 @ 14:10) (100/59 - 164/88)  RR: 20 (12-02-20 @ 14:10) (16 - 93)  SpO2: 95% (12-02-20 @ 14:10) (92% - 96%)  Wt(kg): --  I&O's Summary    01 Dec 2020 07:01  -  02 Dec 2020 07:00  --------------------------------------------------------  IN: 110 mL / OUT: 700 mL / NET: -590 mL    02 Dec 2020 07:01  -  02 Dec 2020 16:17  --------------------------------------------------------  IN: 15 mL / OUT: 0 mL / NET: 15 mL        Appearance: Alert, NAD. A&Ox4  Cardiovascular: Irregularly irregular rhyhtm, tachycardia  Respiratory: Anterior and lateral fields CTA b/l (pt currently on bedrest, unable to sit up)  Extremities: No edema BLE  Vascular: 2+ peripheral pulses    LABS:	 	    CBC Full  -  ( 02 Dec 2020 07:20 )  WBC Count : 6.58 K/uL  Hemoglobin : 15.9 g/dL  Hematocrit : 46.5 %  Platelet Count - Automated : 198 K/uL  Mean Cell Volume : 85.5 fl  Mean Cell Hemoglobin : 29.2 pg  Mean Cell Hemoglobin Concentration : 34.2 gm/dL  Auto Neutrophil # : 3.77 K/uL  Auto Lymphocyte # : 1.91 K/uL  Auto Monocyte # : 0.65 K/uL  Auto Eosinophil # : 0.13 K/uL  Auto Basophil # : 0.11 K/uL  Auto Neutrophil % : 57.2 %  Auto Lymphocyte % : 29.0 %  Auto Monocyte % : 9.9 %  Auto Eosinophil % : 2.0 %  Auto Basophil % : 1.7 %    12-02    143  |  110<H>  |  20  ----------------------------<  111<H>  3.8   |  22  |  0.98  12-01    142  |  109<H>  |  20  ----------------------------<  118<H>  4.7   |  22  |  1.30    Ca    8.6      02 Dec 2020 07:20  Ca    8.5      01 Dec 2020 16:44    TPro  6.8  /  Alb  3.5  /  TBili  0.7  /  DBili  x   /  AST  43<H>  /  ALT  92<H>  /  AlkPhos  56  12-02  TPro  7.0  /  Alb  3.7  /  TBili  0.4  /  DBili  x   /  AST  58<H>  /  ALT  114<H>  /  AlkPhos  57  12-01      proBNP: Serum Pro-Brain Natriuretic Peptide: 1347 pg/mL (12-02 @ 07:20)  Serum Pro-Brain Natriuretic Peptide: 2026 pg/mL (12-01 @ 17:26)    Lipid Profile:   HgA1c:   TSH: Thyroid Stimulating Hormone, Serum: 2.04 uIU/mL (12-02 @ 13:47)  Thyroid Stimulating Hormone, Serum: 2.19 uIU/mL (12-02 @ 01:38)        CARDIAC MARKERS:    Troponin I, Serum: .017 ng/mL (12-01-20 @ 16:44)  Creatine Kinase, Serum: 147 U/L (12-01-20 @ 16:44)      TELEMETRY: Atrial fibrillation with RVR, 140bpm    ECG:  Atrial fibrillation with RVR, 153 bpm    < from: CT Angio Chest w/ IV Cont (12.01.20 @ 18:35) >  EXAM:  CT ANGIO CHEST (W)AW IC                            PROCEDURE DATE:  12/01/2020    INTERPRETATION:  CLINICAL INFORMATION: New onset atrial fibrillation  COMPARISON: None.    PROCEDURE:  CT Angiography of the Chest.  92 ml of Omnipaque 350 was injected intravenously. 8 ml were discarded.  Sagittal and coronal reformats were performed as well as 3D (MIP) reconstructions.    FINDINGS:    LUNGS AND AIRWAYS: Patent central airways.  Septal thickening and groundglass opacity.  PLEURA: Small bilateral pleural effusions.  MEDIASTINUM AND TD: Prominent lymph nodes.  VESSELS: Within normal limits.  HEART: Enlarged. No pericardial effusion.  CHEST WALL AND LOWER NECK: Within normal limits.  VISUALIZED UPPER ABDOMEN: Nonspecific perinephric stranding and fluid.  BONES: Degenerative changes.    IMPRESSION:  Negative for pulmonary embolism.  Small bilateral pleural effusions.  Septal thickening and groundglass opacities, compatible with pulmonary edema.      ROLLY TREJO; Attending Radiologist  This document has been electronically signed. Dec  1 2020  6:47PM    < end of copied text >    Catheterization:  < from: Cardiac Cath Lab - Adult (12.02.20 @ 15:30) >  CORONARY VESSELS: The coronary circulation is right dominant.  LM:   --  LM: Normal.  LAD:   --  Proximal LAD: There was a 30 % stenosis.  --  Mid LAD:There was a 30 % stenosis.  --  D1: There was a 40 % stenosis.  CX:   --  Circumflex: Normal.  RCA:   --  RCA: Angiography showed mild atherosclerosis with no flow  limiting lesions.  COMPLICATIONS: There were no complications.  DIAGNOSTIC RECOMMENDATIONS: The patient should continue with the present  medications.  Prepared and signed by  Jeff Garrett M.D.

## 2020-12-03 LAB
ANION GAP SERPL CALC-SCNC: 12 MMOL/L — SIGNIFICANT CHANGE UP (ref 5–17)
BUN SERPL-MCNC: 18 MG/DL — SIGNIFICANT CHANGE UP (ref 7–23)
CALCIUM SERPL-MCNC: 8.8 MG/DL — SIGNIFICANT CHANGE UP (ref 8.4–10.5)
CHLORIDE SERPL-SCNC: 107 MMOL/L — SIGNIFICANT CHANGE UP (ref 96–108)
CO2 SERPL-SCNC: 20 MMOL/L — LOW (ref 22–31)
CREAT SERPL-MCNC: 1.22 MG/DL — SIGNIFICANT CHANGE UP (ref 0.5–1.3)
GLUCOSE SERPL-MCNC: 95 MG/DL — SIGNIFICANT CHANGE UP (ref 70–99)
HCT VFR BLD CALC: 45.4 % — SIGNIFICANT CHANGE UP (ref 39–50)
HGB BLD-MCNC: 15.7 G/DL — SIGNIFICANT CHANGE UP (ref 13–17)
MAGNESIUM SERPL-MCNC: 2.1 MG/DL — SIGNIFICANT CHANGE UP (ref 1.6–2.6)
MCHC RBC-ENTMCNC: 29.8 PG — SIGNIFICANT CHANGE UP (ref 27–34)
MCHC RBC-ENTMCNC: 34.6 GM/DL — SIGNIFICANT CHANGE UP (ref 32–36)
MCV RBC AUTO: 86.3 FL — SIGNIFICANT CHANGE UP (ref 80–100)
NRBC # BLD: 0 /100 WBCS — SIGNIFICANT CHANGE UP (ref 0–0)
PHOSPHATE SERPL-MCNC: 4.1 MG/DL — SIGNIFICANT CHANGE UP (ref 2.5–4.5)
PLATELET # BLD AUTO: 209 K/UL — SIGNIFICANT CHANGE UP (ref 150–400)
POTASSIUM SERPL-MCNC: 4.1 MMOL/L — SIGNIFICANT CHANGE UP (ref 3.5–5.3)
POTASSIUM SERPL-SCNC: 4.1 MMOL/L — SIGNIFICANT CHANGE UP (ref 3.5–5.3)
RBC # BLD: 5.26 M/UL — SIGNIFICANT CHANGE UP (ref 4.2–5.8)
RBC # FLD: 13.2 % — SIGNIFICANT CHANGE UP (ref 10.3–14.5)
SODIUM SERPL-SCNC: 139 MMOL/L — SIGNIFICANT CHANGE UP (ref 135–145)
WBC # BLD: 6.23 K/UL — SIGNIFICANT CHANGE UP (ref 3.8–10.5)
WBC # FLD AUTO: 6.23 K/UL — SIGNIFICANT CHANGE UP (ref 3.8–10.5)

## 2020-12-03 PROCEDURE — 92960 CARDIOVERSION ELECTRIC EXT: CPT

## 2020-12-03 PROCEDURE — 93306 TTE W/DOPPLER COMPLETE: CPT | Mod: 26

## 2020-12-03 PROCEDURE — 93010 ELECTROCARDIOGRAM REPORT: CPT

## 2020-12-03 PROCEDURE — 93312 ECHO TRANSESOPHAGEAL: CPT | Mod: 26

## 2020-12-03 RX ORDER — AMIODARONE HYDROCHLORIDE 400 MG/1
400 TABLET ORAL EVERY 8 HOURS
Refills: 0 | Status: DISCONTINUED | OUTPATIENT
Start: 2020-12-03 | End: 2020-12-03

## 2020-12-03 RX ORDER — AMIODARONE HYDROCHLORIDE 400 MG/1
400 TABLET ORAL EVERY 8 HOURS
Refills: 0 | Status: COMPLETED | OUTPATIENT
Start: 2020-12-03 | End: 2020-12-07

## 2020-12-03 RX ADMIN — Medication 20 MILLIGRAM(S): at 06:43

## 2020-12-03 RX ADMIN — ATORVASTATIN CALCIUM 10 MILLIGRAM(S): 80 TABLET, FILM COATED ORAL at 21:21

## 2020-12-03 RX ADMIN — AMIODARONE HYDROCHLORIDE 400 MILLIGRAM(S): 400 TABLET ORAL at 21:21

## 2020-12-03 RX ADMIN — AMIODARONE HYDROCHLORIDE 400 MILLIGRAM(S): 400 TABLET ORAL at 12:46

## 2020-12-03 RX ADMIN — Medication 25 MILLIGRAM(S): at 17:30

## 2020-12-03 RX ADMIN — RIVAROXABAN 20 MILLIGRAM(S): KIT at 17:08

## 2020-12-03 RX ADMIN — Medication 25 MILLIGRAM(S): at 12:46

## 2020-12-03 RX ADMIN — Medication 25 MILLIGRAM(S): at 23:03

## 2020-12-03 NOTE — PROGRESS NOTE ADULT - SUBJECTIVE AND OBJECTIVE BOX
24H hour events: No over night events.  Patient seen in echo lab for CAMRYN/Cardioversion     MEDICATIONS:  furosemide    Tablet 20 milliGRAM(s) Oral daily  metoprolol tartrate 25 milliGRAM(s) Oral every 6 hours  rivaroxaban 20 milliGRAM(s) Oral with dinner    atorvastatin 10 milliGRAM(s) Oral at bedtime    REVIEW OF SYSTEMS:  Complete 10point ROS negative.    PHYSICAL EXAM:  T(C): 36.6 (12-03-20 @ 11:15), Max: 37.2 (12-02-20 @ 14:01)  HR: 136 (12-03-20 @ 12:00) (98 - 144)  BP: 116/80 (12-03-20 @ 12:00) (91/59 - 138/83)  RR: 16 (12-03-20 @ 12:00) (16 - 20)  SpO2: 95% (12-03-20 @ 12:00) (92% - 95%)    02 Dec 2020 07:01  -  03 Dec 2020 07:00  --------------------------------------------------------  IN: 15 mL / OUT: 0 mL / NET: 15 mL    03 Dec 2020 07:01  -  03 Dec 2020 12:06  --------------------------------------------------------  IN: 0 mL / OUT: 0 mL / NET: 0 mL      Appearance: Normal	  Cardiovascular: Normal S1 S2, irregular.    Respiratory: Lungs diminished at bases 	  Psychiatry: A & O x 3, Mood & affect appropriate  Gastrointestinal:  Softly distended, Non-tender, + BS	  Extremities: Normal range of motion, No clubbing, cyanosis or edema  Vascular: Peripheral pulses palpable 2+ bilaterally      LABS:	 	    CBC Full  -  ( 03 Dec 2020 05:12 )  WBC Count : 6.23 K/uL  Hemoglobin : 15.7 g/dL  Hematocrit : 45.4 %  Platelet Count - Automated : 209 K/uL  Mean Cell Volume : 86.3 fl  Mean Cell Hemoglobin : 29.8 pg  Mean Cell Hemoglobin Concentration : 34.6 gm/dL  Auto Neutrophil # : x  Auto Lymphocyte # : x  Auto Monocyte # : x  Auto Eosinophil # : x  Auto Basophil # : x  Auto Neutrophil % : x  Auto Lymphocyte % : x  Auto Monocyte % : x  Auto Eosinophil % : x  Auto Basophil % : x    12-03    139  |  107  |  18  ----------------------------<  95  4.1   |  20<L>  |  1.22  12-02    143  |  110<H>  |  20  ----------------------------<  111<H>  3.8   |  22  |  0.98    Ca    8.8      03 Dec 2020 05:12  Ca    8.6      02 Dec 2020 07:20  Phos  4.1     12-03  Mg     2.1     12-03    TPro  6.8  /  Alb  3.5  /  TBili  0.7  /  DBili  x   /  AST  43<H>  /  ALT  92<H>  /  AlkPhos  56  12-02  TPro  7.0  /  Alb  3.7  /  TBili  0.4  /  DBili  x   /  AST  58<H>  /  ALT  114<H>  /  AlkPhos  57  12-01      proBNP: Serum Pro-Brain Natriuretic Peptide: 1347 pg/mL (12-02 @ 07:20)  Serum Pro-Brain Natriuretic Peptide: 2026 pg/mL (12-01 @ 17:26)      TELEMETRY: 	  AFib with -140

## 2020-12-03 NOTE — PROGRESS NOTE ADULT - SUBJECTIVE AND OBJECTIVE BOX
HPI:  56 y/o male  (no implantable devices) with no pertinent PMHx presented to [place] ED from Urgent Care by EMS c/o SOB x 1 week. Pt states he started having difficulty breathing while at rest. EMS gave adenosine with no effect. In the ED was found to have new onset Afib w/ RVR with max HR at 150. CTA was done to r/o PE- D-Dimer was mildly elevated;  and found small B/L pleural effusions. He was started on Cardizem, had ECHO performed -severe LV systolic dysfunction, mod/severe MR, mild pulm HTN. In some views, there appears to be segmental wall motion abnormalities. Cardizem was stopped this morning, and started on metoprolol. He transferred to [facility] today for right and left heart cath. He currently endorses mild SOB, and denies any other complaints.        (02 Dec 2020 14:10)

## 2020-12-03 NOTE — PROGRESS NOTE ADULT - ASSESSMENT
56 y/o male with no pertinent PMHx presented to [place] ED from Urgent Care by EMS c/o SOB x 1 week. In the ED was found to have new onset Afib w/ RVR up to 150bpm. CT with small b/l pleural effusions. TTE revealed severe LV systolic dysfunction, mod/severe MR, mild pulm HTN as well as segmental wall motion abnormalities. Pt endorses increase in alcohol use over the past year as well as increased caffeine use. Patient was initially on a cardizem gtt, discontinued on 12/2 and started on metoprolol. He transferred to [facility] today for right and left heart cath.    1. New onset Atrial Fibrillation with RVR  -Continue to monitor on telemetry  -Keep K >4 and Mg >2  -[id number] Score: 2  -Continue Metoprolol tartrate 25mg q6h  -Continue Xarelto 20mg qd  -s/p attempted CAMRYN/DCCV 12/3 with brief Sinus Bradycardia and ERAF (return to AFib),  IV Lopressor 5mg given by Anesthesia post DCCV   -Start Amiodarone 400mg TID now, likely AFib ablation on Monday     2. severe LV systolic dysfunction, mod/severe MR  -Continue diuresis for now     3. B/l pleural effusions  -Lasix 20mg daily

## 2020-12-03 NOTE — PROGRESS NOTE ADULT - ASSESSMENT
54 y/o male with above pmhx, s/p diagnostic left and right heart cath on 12/2, with new Afib with RVR, awaiting CAMRYN with cardioversion

## 2020-12-04 LAB
ALBUMIN SERPL ELPH-MCNC: 3.7 G/DL — SIGNIFICANT CHANGE UP (ref 3.3–5)
ALP SERPL-CCNC: 53 U/L — SIGNIFICANT CHANGE UP (ref 40–120)
ALT FLD-CCNC: 62 U/L — HIGH (ref 10–45)
AST SERPL-CCNC: 29 U/L — SIGNIFICANT CHANGE UP (ref 10–40)
BILIRUB DIRECT SERPL-MCNC: 0.2 MG/DL — SIGNIFICANT CHANGE UP (ref 0–0.2)
BILIRUB INDIRECT FLD-MCNC: 0.6 MG/DL — SIGNIFICANT CHANGE UP (ref 0.2–1)
BILIRUB SERPL-MCNC: 0.8 MG/DL — SIGNIFICANT CHANGE UP (ref 0.2–1.2)
PROT SERPL-MCNC: 6.2 G/DL — SIGNIFICANT CHANGE UP (ref 6–8.3)
SARS-COV-2 IGG SERPL IA-ACNC: 0.5 RATIO — SIGNIFICANT CHANGE UP
SARS-COV-2 IGG SERPL QL IA: NEGATIVE — SIGNIFICANT CHANGE UP
SARS-COV-2 IGG SERPL QL IA: NEGATIVE — SIGNIFICANT CHANGE UP
SARS-COV-2 IGM SERPL IA-ACNC: 0.27 RATIO — SIGNIFICANT CHANGE UP

## 2020-12-04 PROCEDURE — 99232 SBSQ HOSP IP/OBS MODERATE 35: CPT

## 2020-12-04 RX ADMIN — ATORVASTATIN CALCIUM 10 MILLIGRAM(S): 80 TABLET, FILM COATED ORAL at 21:43

## 2020-12-04 RX ADMIN — Medication 25 MILLIGRAM(S): at 12:45

## 2020-12-04 RX ADMIN — Medication 25 MILLIGRAM(S): at 05:00

## 2020-12-04 RX ADMIN — Medication 25 MILLIGRAM(S): at 18:03

## 2020-12-04 RX ADMIN — AMIODARONE HYDROCHLORIDE 400 MILLIGRAM(S): 400 TABLET ORAL at 21:43

## 2020-12-04 RX ADMIN — RIVAROXABAN 20 MILLIGRAM(S): KIT at 18:03

## 2020-12-04 RX ADMIN — Medication 20 MILLIGRAM(S): at 05:00

## 2020-12-04 RX ADMIN — AMIODARONE HYDROCHLORIDE 400 MILLIGRAM(S): 400 TABLET ORAL at 14:07

## 2020-12-04 RX ADMIN — AMIODARONE HYDROCHLORIDE 400 MILLIGRAM(S): 400 TABLET ORAL at 05:00

## 2020-12-04 NOTE — PROGRESS NOTE ADULT - SUBJECTIVE AND OBJECTIVE BOX
24H hour events: States shortness of breath has resolved. Remains in Afib up to 140's.    MEDICATIONS:  aMIOdarone    Tablet 400 milliGRAM(s) Oral every 8 hours  furosemide    Tablet 20 milliGRAM(s) Oral daily  metoprolol tartrate 25 milliGRAM(s) Oral every 6 hours  rivaroxaban 20 milliGRAM(s) Oral with dinner  atorvastatin 10 milliGRAM(s) Oral at bedtime        REVIEW OF SYSTEMS:  See HPI, otherwise ROS negative.    PHYSICAL EXAM:  T(C): 36.5 (12-04-20 @ 04:38), Max: 36.9 (12-03-20 @ 10:02)  HR: 93 (12-04-20 @ 04:38) (93 - 154)  BP: 126/82 (12-04-20 @ 04:38) (105/68 - 138/83)  RR: 18 (12-04-20 @ 07:41) (16 - 18)  SpO2: 97% (12-04-20 @ 07:41) (92% - 97%)  Wt(kg): --  I&O's Summary    03 Dec 2020 07:01  -  04 Dec 2020 07:00  --------------------------------------------------------  IN: 480 mL / OUT: 0 mL / NET: 480 mL        Appearance: Alert. NAD	  Cardiovascular: Irregularly irregular rhythm  Respiratory: Diminished at b/l bases	  Extremities: No edema BLE  Vascular: Peripheral pulses palpable 2+ bilaterally      LABS:	 	    CBC Full  -  ( 03 Dec 2020 05:12 )  WBC Count : 6.23 K/uL  Hemoglobin : 15.7 g/dL  Hematocrit : 45.4 %  Platelet Count - Automated : 209 K/uL  Mean Cell Volume : 86.3 fl  Mean Cell Hemoglobin : 29.8 pg  Mean Cell Hemoglobin Concentration : 34.6 gm/dL  Auto Neutrophil # : x  Auto Lymphocyte # : x  Auto Monocyte # : x  Auto Eosinophil # : x  Auto Basophil # : x  Auto Neutrophil % : x  Auto Lymphocyte % : x  Auto Monocyte % : x  Auto Eosinophil % : x  Auto Basophil % : x    12-03    139  |  107  |  18  ----------------------------<  95  4.1   |  20<L>  |  1.22    Ca    8.8      03 Dec 2020 05:12  Phos  4.1     12-03  Mg     2.1     12-03    TPro  6.2  /  Alb  3.7  /  TBili  0.8  /  DBili  0.2  /  AST  29  /  ALT  62<H>  /  AlkPhos  53  12-04      proBNP: Serum Pro-Brain Natriuretic Peptide: 1347 pg/mL (12-02 @ 07:20)  Serum Pro-Brain Natriuretic Peptide: 2026 pg/mL (12-01 @ 17:26)      TELEMETRY: Atrial fibrillation, 110-140 bpm     24H hour events: States shortness of breath has resolved. Remains in Afib 110-140bpm.    MEDICATIONS:  aMIOdarone    Tablet 400 milliGRAM(s) Oral every 8 hours  furosemide    Tablet 20 milliGRAM(s) Oral daily  metoprolol tartrate 25 milliGRAM(s) Oral every 6 hours  rivaroxaban 20 milliGRAM(s) Oral with dinner  atorvastatin 10 milliGRAM(s) Oral at bedtime        REVIEW OF SYSTEMS:  See HPI, otherwise ROS negative.    PHYSICAL EXAM:  T(C): 36.5 (12-04-20 @ 04:38), Max: 36.9 (12-03-20 @ 10:02)  HR: 93 (12-04-20 @ 04:38) (93 - 154)  BP: 126/82 (12-04-20 @ 04:38) (105/68 - 138/83)  RR: 18 (12-04-20 @ 07:41) (16 - 18)  SpO2: 97% (12-04-20 @ 07:41) (92% - 97%)  Wt(kg): --  I&O's Summary    03 Dec 2020 07:01  -  04 Dec 2020 07:00  --------------------------------------------------------  IN: 480 mL / OUT: 0 mL / NET: 480 mL        Appearance: Alert. NAD	  Cardiovascular: Irregularly irregular rhythm  Respiratory: Diminished at b/l bases	  Extremities: No edema BLE  Vascular: Peripheral pulses palpable 2+ bilaterally      LABS:	 	    CBC Full  -  ( 03 Dec 2020 05:12 )  WBC Count : 6.23 K/uL  Hemoglobin : 15.7 g/dL  Hematocrit : 45.4 %  Platelet Count - Automated : 209 K/uL  Mean Cell Volume : 86.3 fl  Mean Cell Hemoglobin : 29.8 pg  Mean Cell Hemoglobin Concentration : 34.6 gm/dL  Auto Neutrophil # : x  Auto Lymphocyte # : x  Auto Monocyte # : x  Auto Eosinophil # : x  Auto Basophil # : x  Auto Neutrophil % : x  Auto Lymphocyte % : x  Auto Monocyte % : x  Auto Eosinophil % : x  Auto Basophil % : x    12-03    139  |  107  |  18  ----------------------------<  95  4.1   |  20<L>  |  1.22    Ca    8.8      03 Dec 2020 05:12  Phos  4.1     12-03  Mg     2.1     12-03    TPro  6.2  /  Alb  3.7  /  TBili  0.8  /  DBili  0.2  /  AST  29  /  ALT  62<H>  /  AlkPhos  53  12-04      proBNP: Serum Pro-Brain Natriuretic Peptide: 1347 pg/mL (12-02 @ 07:20)  Serum Pro-Brain Natriuretic Peptide: 2026 pg/mL (12-01 @ 17:26)      TELEMETRY: Atrial fibrillation, 110-140 bpm

## 2020-12-04 NOTE — PROGRESS NOTE ADULT - SUBJECTIVE AND OBJECTIVE BOX
SUBJECTIVE / OVERNIGHT EVENTS: pt denies chest pain, shortness of breath       MEDICATIONS  (STANDING):  aMIOdarone    Tablet 400 milliGRAM(s) Oral every 8 hours  atorvastatin 10 milliGRAM(s) Oral at bedtime  furosemide    Tablet 20 milliGRAM(s) Oral daily  metoprolol tartrate 25 milliGRAM(s) Oral every 6 hours  rivaroxaban 20 milliGRAM(s) Oral with dinner    MEDICATIONS  (PRN):    Vital Signs Last 24 Hrs  T(C): 36.9 (04 Dec 2020 20:02), Max: 36.9 (04 Dec 2020 20:02)  T(F): 98.4 (04 Dec 2020 20:02), Max: 98.4 (04 Dec 2020 20:02)  HR: 108 (04 Dec 2020 20:02) (93 - 135)  BP: 117/71 (04 Dec 2020 20:02) (111/76 - 126/82)  BP(mean): --  RR: 18 (04 Dec 2020 20:02) (18 - 18)  SpO2: 96% (04 Dec 2020 20:02) (96% - 99%)    CAPILLARY BLOOD GLUCOSE        I&O's Summary    03 Dec 2020 07:01  -  04 Dec 2020 07:00  --------------------------------------------------------  IN: 480 mL / OUT: 0 mL / NET: 480 mL    04 Dec 2020 07:01  -  04 Dec 2020 21:42  --------------------------------------------------------  IN: 240 mL / OUT: 0 mL / NET: 240 mL        Constitutional: No fever, fatigue  Skin: No rash.  Eyes: No recent vision problems or eye pain.  ENT: No congestion, ear pain, or sore throat.  Cardiovascular: No chest pain or palpation.  Respiratory: No cough, shortness of breath, congestion, or wheezing.  Gastrointestinal: No abdominal pain, nausea, vomiting, or diarrhea.  Genitourinary: No dysuria.  Musculoskeletal: No joint swelling.  Neurologic: No headache.    PHYSICAL EXAM:  GENERAL: NAD  EYES: EOMI, PERRLA  NECK: Supple, No JVD  CHEST/LUNG: dec breath sounds at bases  HEART:  S1 , S2 +  ABDOMEN: soft , bs+  EXTREMITIES: no edema   NEUROLOGY:alert awake      LABS:                        15.7   6.23  )-----------( 209      ( 03 Dec 2020 05:12 )             45.4     12-03    139  |  107  |  18  ----------------------------<  95  4.1   |  20<L>  |  1.22    Ca    8.8      03 Dec 2020 05:12  Phos  4.1     12-03  Mg     2.1     12-03    TPro  6.2  /  Alb  3.7  /  TBili  0.8  /  DBili  0.2  /  AST  29  /  ALT  62<H>  /  AlkPhos  53  12-04              RADIOLOGY & ADDITIONAL TESTS:    Imaging Personally Reviewed:    Consultant(s) Notes Reviewed:      Care Discussed with Consultants/Other Providers:

## 2020-12-04 NOTE — PROGRESS NOTE ADULT - ATTENDING COMMENTS
Rates are in the 110-130 bpm as opposed to 130-150 yesterday. He feels better, Will continue amiodarone over the weekend and plan ablation Monday. Hopefully cardiomyopathy will recover after restoration of sinus
80 minutes spent on this visit, 50% visit time spent in care co-ordination with other attendings and counselling patient  I have discussed care plan with patient and HCP,expressed understanding of problems treatment and their effect and side effects, alternatives in detail,I have asked if they have any questions and concerns and appropriately addressed them to best of my ability  Reviewed all diagonostic tests, lab results and drug drug interactions, and medications
Tera Ashton, [facility]    [phone]

## 2020-12-04 NOTE — PROGRESS NOTE ADULT - ASSESSMENT
56 y/o male with above pmhx, s/p diagnostic left and right heart cath on 12/2, with new Afib with RVR, awaiting CAMRYN with cardioversion

## 2020-12-05 PROCEDURE — 99232 SBSQ HOSP IP/OBS MODERATE 35: CPT

## 2020-12-05 RX ADMIN — Medication 25 MILLIGRAM(S): at 13:07

## 2020-12-05 RX ADMIN — Medication 25 MILLIGRAM(S): at 05:30

## 2020-12-05 RX ADMIN — RIVAROXABAN 20 MILLIGRAM(S): KIT at 17:00

## 2020-12-05 RX ADMIN — Medication 25 MILLIGRAM(S): at 17:00

## 2020-12-05 RX ADMIN — Medication 25 MILLIGRAM(S): at 00:04

## 2020-12-05 RX ADMIN — AMIODARONE HYDROCHLORIDE 400 MILLIGRAM(S): 400 TABLET ORAL at 13:07

## 2020-12-05 RX ADMIN — AMIODARONE HYDROCHLORIDE 400 MILLIGRAM(S): 400 TABLET ORAL at 23:09

## 2020-12-05 RX ADMIN — AMIODARONE HYDROCHLORIDE 400 MILLIGRAM(S): 400 TABLET ORAL at 05:30

## 2020-12-05 RX ADMIN — ATORVASTATIN CALCIUM 10 MILLIGRAM(S): 80 TABLET, FILM COATED ORAL at 23:08

## 2020-12-05 RX ADMIN — Medication 20 MILLIGRAM(S): at 05:30

## 2020-12-05 RX ADMIN — Medication 25 MILLIGRAM(S): at 23:08

## 2020-12-05 NOTE — PROGRESS NOTE ADULT - ASSESSMENT
1. AF with RVR leading to probably tachycardia induced cardiomyopathy: Better compensated now with slower rates. Plan ablation Monday. CAMRYN with no KAVON thrombus with ERAF. On 20 mg daily Xarelto. Currently on amiodarone which I plan to stop 1-2 months post ablation  2. Cardiomyopathy: Hopefully will reverse after restoration of sinus, BP may be high enough to start low dose ACE but will wait to make sure SBP consistently > 100. He is on metoprolol and low dose furosemide  3. Nonobstructive CAD: He is on atorvastatin but may need this increased, Will add low dose ASA after ablation to rivaroxaban

## 2020-12-05 NOTE — PROGRESS NOTE ADULT - SUBJECTIVE AND OBJECTIVE BOX
SUBJECTIVE / OVERNIGHT EVENTS: pt denies chest pain, shortness of breath     MEDICATIONS  (STANDING):  aMIOdarone    Tablet 400 milliGRAM(s) Oral every 8 hours  atorvastatin 10 milliGRAM(s) Oral at bedtime  furosemide    Tablet 20 milliGRAM(s) Oral daily  metoprolol tartrate 25 milliGRAM(s) Oral every 6 hours  rivaroxaban 20 milliGRAM(s) Oral with dinner    MEDICATIONS  (PRN):    Vital Signs Last 24 Hrs  T(C): 36.6 (05 Dec 2020 20:10), Max: 36.7 (05 Dec 2020 04:58)  T(F): 97.9 (05 Dec 2020 20:10), Max: 98 (05 Dec 2020 04:58)  HR: 104 (05 Dec 2020 20:10) (77 - 116)  BP: 113/77 (05 Dec 2020 20:10) (112/81 - 124/86)  BP(mean): --  RR: 18 (05 Dec 2020 20:10) (18 - 18)  SpO2: 95% (05 Dec 2020 20:10) (94% - 95%)      Constitutional: No fever, fatigue  Skin: No rash.  Eyes: No recent vision problems or eye pain.  ENT: No congestion, ear pain, or sore throat.  Cardiovascular: No chest pain or palpation.  Respiratory: No cough, shortness of breath, congestion, or wheezing.  Gastrointestinal: No abdominal pain, nausea, vomiting, or diarrhea.  Genitourinary: No dysuria.  Musculoskeletal: No joint swelling.  Neurologic: No headache.    PHYSICAL EXAM:  GENERAL: NAD  EYES: EOMI, PERRLA  NECK: Supple, No JVD  CHEST/LUNG: dec breath sounds at bases  HEART:  S1 , S2 +  ABDOMEN: soft , bs+  EXTREMITIES: no edema   NEUROLOGY:alert awake    LABS:      TPro  6.2  /  Alb  3.7  /  TBili  0.8  /  DBili  0.2  /  AST  29  /  ALT  62<H>  /  AlkPhos  53  12-04    Creatinine Trend: 1.22 <--, 0.98 <--, 1.30 <--    Urine Studies:            LIVER FUNCTIONS - ( 04 Dec 2020 05:52 )  Alb: 3.7 g/dL / Pro: 6.2 g/dL / ALK PHOS: 53 U/L / ALT: 62 U/L / AST: 29 U/L / GGT: x             Consultant(s) Notes Reviewed:      Care Discussed with Consultants/Other Providers:

## 2020-12-05 NOTE — PROGRESS NOTE ADULT - SUBJECTIVE AND OBJECTIVE BOX
He feels better with no dyspnea orthopnea. Rates in AF have come down to  bpm.    MEDICATIONS  (STANDING):  aMIOdarone    Tablet 400 milliGRAM(s) Oral every 8 hours  atorvastatin 10 milliGRAM(s) Oral at bedtime  furosemide    Tablet 20 milliGRAM(s) Oral daily  metoprolol tartrate 25 milliGRAM(s) Oral every 6 hours  rivaroxaban 20 milliGRAM(s) Oral with dinner    Allergies    No Known Allergies    REVIEW OF SYSTEMS      General: WNL	  Skin/Breast: No lesions  Ophthalmologic: normal vision  ENMT:	WNL  Respiratory and Thorax: no respiratory difficulty  Cardiovascular:	AF, MR cardiomyopathy from AF  Gastrointestinal:	abdominal fullness improved  Genitourinary:	WNL  Musculoskeletal:	WNL  Neurological:	No deficits  Psychiatric:	No depression  Hematology/Lymphatics:	 WNL  Endocrine:	No thyroid issues or DM  Allergic/Immunologic:	WNL      T(C): 36.7 (12-05-20 @ 04:58), Max: 36.9 (12-04-20 @ 20:02)  HR: 109 (12-05-20 @ 04:58) (77 - 123)  BP: 112/81 (12-05-20 @ 04:58) (111/76 - 125/85)  RR: 18 (12-05-20 @ 04:58) (18 - 18)  SpO2: 94% (12-05-20 @ 04:58) (94% - 99%)    GENERAL: patient appears well, no acute distress, appropriate, pleasant  EYES: sclera clear, no exudates  ENMT: oropharynx clear without erythema, no exudates, moist mucous membranes  NECK: supple, soft, no thyromegaly noted, no A waves  LUNGS: good air entry bilaterally, clear to auscultation, symmetric breath sounds, no wheezing or rhonchi appreciated  HEART: carotid upstrokes reduced, soft S1/S2, irregular rate and rhythm, tachy, 1/6 systolic apical murmur noted, no lower extremity edema  GASTROINTESTINAL: abdomen is soft, nontender, nondistended, normoactive bowel sounds, no palpable masses  INTEGUMENT: good skin turgor, no lesions noted  MUSCULOSKELETAL: no clubbing or cyanosis, no obvious deformity  NEUROLOGIC: awake, alert, oriented x3, good muscle tone in 4 extremities, no obvious sensory deficits  PSYCHIATRIC: mood is good, affect is congruent, linear and logical thought process  HEME/LYMPH: no obvious ecchymosis or petechiae       LABS:    TPro  6.2  /  Alb  3.7  /  TBili  0.8  /  DBili  0.2  /  AST  29  /  ALT  62<H>  /  AlkPhos  53  12-04

## 2020-12-06 LAB
ANION GAP SERPL CALC-SCNC: 12 MMOL/L — SIGNIFICANT CHANGE UP (ref 5–17)
BUN SERPL-MCNC: 20 MG/DL — SIGNIFICANT CHANGE UP (ref 7–23)
CALCIUM SERPL-MCNC: 9.1 MG/DL — SIGNIFICANT CHANGE UP (ref 8.4–10.5)
CHLORIDE SERPL-SCNC: 108 MMOL/L — SIGNIFICANT CHANGE UP (ref 96–108)
CO2 SERPL-SCNC: 22 MMOL/L — SIGNIFICANT CHANGE UP (ref 22–31)
CREAT SERPL-MCNC: 1.27 MG/DL — SIGNIFICANT CHANGE UP (ref 0.5–1.3)
GLUCOSE SERPL-MCNC: 86 MG/DL — SIGNIFICANT CHANGE UP (ref 70–99)
HCT VFR BLD CALC: 47.5 % — SIGNIFICANT CHANGE UP (ref 39–50)
HGB BLD-MCNC: 16.4 G/DL — SIGNIFICANT CHANGE UP (ref 13–17)
MCHC RBC-ENTMCNC: 29.9 PG — SIGNIFICANT CHANGE UP (ref 27–34)
MCHC RBC-ENTMCNC: 34.5 GM/DL — SIGNIFICANT CHANGE UP (ref 32–36)
MCV RBC AUTO: 86.7 FL — SIGNIFICANT CHANGE UP (ref 80–100)
NRBC # BLD: 0 /100 WBCS — SIGNIFICANT CHANGE UP (ref 0–0)
PLATELET # BLD AUTO: 216 K/UL — SIGNIFICANT CHANGE UP (ref 150–400)
POTASSIUM SERPL-MCNC: 4.1 MMOL/L — SIGNIFICANT CHANGE UP (ref 3.5–5.3)
POTASSIUM SERPL-SCNC: 4.1 MMOL/L — SIGNIFICANT CHANGE UP (ref 3.5–5.3)
RBC # BLD: 5.48 M/UL — SIGNIFICANT CHANGE UP (ref 4.2–5.8)
RBC # FLD: 12.8 % — SIGNIFICANT CHANGE UP (ref 10.3–14.5)
SODIUM SERPL-SCNC: 142 MMOL/L — SIGNIFICANT CHANGE UP (ref 135–145)
WBC # BLD: 7.49 K/UL — SIGNIFICANT CHANGE UP (ref 3.8–10.5)
WBC # FLD AUTO: 7.49 K/UL — SIGNIFICANT CHANGE UP (ref 3.8–10.5)

## 2020-12-06 RX ADMIN — Medication 25 MILLIGRAM(S): at 12:44

## 2020-12-06 RX ADMIN — Medication 20 MILLIGRAM(S): at 05:09

## 2020-12-06 RX ADMIN — AMIODARONE HYDROCHLORIDE 400 MILLIGRAM(S): 400 TABLET ORAL at 05:09

## 2020-12-06 RX ADMIN — Medication 25 MILLIGRAM(S): at 23:14

## 2020-12-06 RX ADMIN — Medication 25 MILLIGRAM(S): at 18:25

## 2020-12-06 RX ADMIN — RIVAROXABAN 20 MILLIGRAM(S): KIT at 18:25

## 2020-12-06 RX ADMIN — Medication 25 MILLIGRAM(S): at 05:09

## 2020-12-06 RX ADMIN — ATORVASTATIN CALCIUM 10 MILLIGRAM(S): 80 TABLET, FILM COATED ORAL at 23:14

## 2020-12-06 RX ADMIN — AMIODARONE HYDROCHLORIDE 400 MILLIGRAM(S): 400 TABLET ORAL at 23:14

## 2020-12-06 RX ADMIN — AMIODARONE HYDROCHLORIDE 400 MILLIGRAM(S): 400 TABLET ORAL at 12:44

## 2020-12-06 NOTE — PROGRESS NOTE ADULT - SUBJECTIVE AND OBJECTIVE BOX
SUBJECTIVE / OVERNIGHT EVENTS: pt denies chest pain, shortness of breath     MEDICATIONS  (STANDING):  aMIOdarone    Tablet 400 milliGRAM(s) Oral every 8 hours  atorvastatin 10 milliGRAM(s) Oral at bedtime  furosemide    Tablet 20 milliGRAM(s) Oral daily  metoprolol tartrate 25 milliGRAM(s) Oral every 6 hours  rivaroxaban 20 milliGRAM(s) Oral with dinner    MEDICATIONS  (PRN):    Vital Signs Last 24 Hrs  T(C): 36.7 (06 Dec 2020 11:49), Max: 36.7 (06 Dec 2020 11:49)  T(F): 98.1 (06 Dec 2020 11:49), Max: 98.1 (06 Dec 2020 11:49)  HR: 111 (06 Dec 2020 12:38) (93 - 111)  BP: 103/95 (06 Dec 2020 12:38) (103/95 - 120/83)  BP(mean): --  RR: 16 (06 Dec 2020 11:49) (16 - 18)  SpO2: 95% (06 Dec 2020 11:49) (94% - 95%)    Constitutional: No fever, fatigue  Skin: No rash.  Eyes: No recent vision problems or eye pain.  ENT: No congestion, ear pain, or sore throat.  Cardiovascular: No chest pain or palpation.  Respiratory: No cough, shortness of breath, congestion, or wheezing.  Gastrointestinal: No abdominal pain, nausea, vomiting, or diarrhea.  Genitourinary: No dysuria.  Musculoskeletal: No joint swelling.  Neurologic: No headache.    PHYSICAL EXAM:  GENERAL: NAD  EYES: EOMI, PERRLA  NECK: Supple, No JVD  CHEST/LUNG: dec breath sounds at bases  HEART:  S1 , S2 +  ABDOMEN: soft , bs+  EXTREMITIES: no edema   NEUROLOGY:alert awake    LABS:  12-06    142  |  108  |  20  ----------------------------<  86  4.1   |  22  |  1.27    Ca    9.1      06 Dec 2020 06:18      Creatinine Trend: 1.27 <--, 1.22 <--, 0.98 <--, 1.30 <--                        16.4   7.49  )-----------( 216      ( 06 Dec 2020 06:18 )             47.5     Urine Studies:                  Care Discussed with Consultants/Other Providers:

## 2020-12-07 LAB
ANION GAP SERPL CALC-SCNC: 11 MMOL/L — SIGNIFICANT CHANGE UP (ref 5–17)
BUN SERPL-MCNC: 18 MG/DL — SIGNIFICANT CHANGE UP (ref 7–23)
CALCIUM SERPL-MCNC: 8.9 MG/DL — SIGNIFICANT CHANGE UP (ref 8.4–10.5)
CHLORIDE SERPL-SCNC: 107 MMOL/L — SIGNIFICANT CHANGE UP (ref 96–108)
CO2 SERPL-SCNC: 20 MMOL/L — LOW (ref 22–31)
CREAT SERPL-MCNC: 1.15 MG/DL — SIGNIFICANT CHANGE UP (ref 0.5–1.3)
GLUCOSE SERPL-MCNC: 94 MG/DL — SIGNIFICANT CHANGE UP (ref 70–99)
HCT VFR BLD CALC: 47.1 % — SIGNIFICANT CHANGE UP (ref 39–50)
HGB BLD-MCNC: 16.1 G/DL — SIGNIFICANT CHANGE UP (ref 13–17)
INR BLD: 1.8 RATIO — HIGH (ref 0.88–1.16)
MCHC RBC-ENTMCNC: 29.4 PG — SIGNIFICANT CHANGE UP (ref 27–34)
MCHC RBC-ENTMCNC: 34.2 GM/DL — SIGNIFICANT CHANGE UP (ref 32–36)
MCV RBC AUTO: 86.1 FL — SIGNIFICANT CHANGE UP (ref 80–100)
NRBC # BLD: 0 /100 WBCS — SIGNIFICANT CHANGE UP (ref 0–0)
PLATELET # BLD AUTO: 196 K/UL — SIGNIFICANT CHANGE UP (ref 150–400)
POTASSIUM SERPL-MCNC: 4.1 MMOL/L — SIGNIFICANT CHANGE UP (ref 3.5–5.3)
POTASSIUM SERPL-SCNC: 4.1 MMOL/L — SIGNIFICANT CHANGE UP (ref 3.5–5.3)
PROTHROM AB SERPL-ACNC: 21 SEC — HIGH (ref 10.6–13.6)
RBC # BLD: 5.47 M/UL — SIGNIFICANT CHANGE UP (ref 4.2–5.8)
RBC # FLD: 12.8 % — SIGNIFICANT CHANGE UP (ref 10.3–14.5)
SODIUM SERPL-SCNC: 138 MMOL/L — SIGNIFICANT CHANGE UP (ref 135–145)
WBC # BLD: 6.82 K/UL — SIGNIFICANT CHANGE UP (ref 3.8–10.5)
WBC # FLD AUTO: 6.82 K/UL — SIGNIFICANT CHANGE UP (ref 3.8–10.5)

## 2020-12-07 PROCEDURE — 93656 COMPRE EP EVAL ABLTJ ATR FIB: CPT

## 2020-12-07 PROCEDURE — 93010 ELECTROCARDIOGRAM REPORT: CPT

## 2020-12-07 PROCEDURE — 93613 INTRACARDIAC EPHYS 3D MAPG: CPT

## 2020-12-07 PROCEDURE — 93655 ICAR CATH ABLTJ DSCRT ARRHYT: CPT

## 2020-12-07 PROCEDURE — 93662 INTRACARDIAC ECG (ICE): CPT | Mod: 26

## 2020-12-07 RX ORDER — PANTOPRAZOLE SODIUM 20 MG/1
40 TABLET, DELAYED RELEASE ORAL
Refills: 0 | Status: DISCONTINUED | OUTPATIENT
Start: 2020-12-07 | End: 2020-12-08

## 2020-12-07 RX ORDER — METOPROLOL TARTRATE 50 MG
50 TABLET ORAL
Refills: 0 | Status: DISCONTINUED | OUTPATIENT
Start: 2020-12-07 | End: 2020-12-08

## 2020-12-07 RX ORDER — CEFAZOLIN SODIUM 1 G
2000 VIAL (EA) INJECTION ONCE
Refills: 0 | Status: DISCONTINUED | OUTPATIENT
Start: 2020-12-07 | End: 2020-12-07

## 2020-12-07 RX ORDER — ACETAMINOPHEN 500 MG
1000 TABLET ORAL ONCE
Refills: 0 | Status: COMPLETED | OUTPATIENT
Start: 2020-12-07 | End: 2020-12-07

## 2020-12-07 RX ORDER — PANTOPRAZOLE SODIUM 20 MG/1
40 TABLET, DELAYED RELEASE ORAL ONCE
Refills: 0 | Status: COMPLETED | OUTPATIENT
Start: 2020-12-07 | End: 2020-12-07

## 2020-12-07 RX ORDER — FUROSEMIDE 40 MG
20 TABLET ORAL ONCE
Refills: 0 | Status: COMPLETED | OUTPATIENT
Start: 2020-12-07 | End: 2020-12-07

## 2020-12-07 RX ORDER — CEFAZOLIN SODIUM 1 G
2000 VIAL (EA) INJECTION ONCE
Refills: 0 | Status: DISCONTINUED | OUTPATIENT
Start: 2020-12-07 | End: 2020-12-08

## 2020-12-07 RX ADMIN — Medication 20 MILLIGRAM(S): at 14:36

## 2020-12-07 RX ADMIN — PANTOPRAZOLE SODIUM 40 MILLIGRAM(S): 20 TABLET, DELAYED RELEASE ORAL at 14:36

## 2020-12-07 RX ADMIN — RIVAROXABAN 20 MILLIGRAM(S): KIT at 17:04

## 2020-12-07 RX ADMIN — Medication 2000 MILLIGRAM(S): at 15:10

## 2020-12-07 RX ADMIN — Medication 50 MILLIGRAM(S): at 21:36

## 2020-12-07 RX ADMIN — AMIODARONE HYDROCHLORIDE 400 MILLIGRAM(S): 400 TABLET ORAL at 06:20

## 2020-12-07 RX ADMIN — Medication 25 MILLIGRAM(S): at 06:20

## 2020-12-07 RX ADMIN — Medication 20 MILLIGRAM(S): at 06:20

## 2020-12-07 RX ADMIN — Medication 1000 MILLIGRAM(S): at 16:10

## 2020-12-07 RX ADMIN — Medication 400 MILLIGRAM(S): at 15:32

## 2020-12-07 RX ADMIN — ATORVASTATIN CALCIUM 10 MILLIGRAM(S): 80 TABLET, FILM COATED ORAL at 21:36

## 2020-12-07 NOTE — PROGRESS NOTE ADULT - SUBJECTIVE AND OBJECTIVE BOX
SUBJECTIVE / OVERNIGHT EVENTS: pt denies chest pain, shortness of breath     MEDICATIONS  (STANDING):  atorvastatin 10 milliGRAM(s) Oral at bedtime  ceFAZolin   IVPB 2000 milliGRAM(s) IV Intermittent once  furosemide    Tablet 20 milliGRAM(s) Oral daily  metoprolol succinate ER 50 milliGRAM(s) Oral two times a day  pantoprazole    Tablet 40 milliGRAM(s) Oral before breakfast  rivaroxaban 20 milliGRAM(s) Oral with dinner    MEDICATIONS  (PRN):    Vital Signs Last 24 Hrs  T(C): 36.8 (07 Dec 2020 15:45), Max: 37.3 (07 Dec 2020 14:00)  T(F): 98.3 (07 Dec 2020 15:45), Max: 99.1 (07 Dec 2020 14:00)  HR: 55 (07 Dec 2020 20:15) (55 - 102)  BP: 91/64 (07 Dec 2020 20:15) (91/64 - 118/89)  BP(mean): --  RR: 18 (07 Dec 2020 20:15) (16 - 20)  SpO2: 98% (07 Dec 2020 20:15) (93% - 100%)    Constitutional: No fever, fatigue  Skin: No rash.  Eyes: No recent vision problems or eye pain.  ENT: No congestion, ear pain, or sore throat.  Cardiovascular: No chest pain or palpation.  Respiratory: No cough, shortness of breath, congestion, or wheezing.  Gastrointestinal: No abdominal pain, nausea, vomiting, or diarrhea.  Genitourinary: No dysuria.  Musculoskeletal: No joint swelling.  Neurologic: No headache.    PHYSICAL EXAM:  GENERAL: NAD  EYES: EOMI, PERRLA  NECK: Supple, No JVD  CHEST/LUNG: dec breath sounds at bases  HEART:  S1 , S2 +  ABDOMEN: soft , bs+  EXTREMITIES: no edema   NEUROLOGY:alert awake    LABS:  12-07    138  |  107  |  18  ----------------------------<  94  4.1   |  20<L>  |  1.15    Ca    8.9      07 Dec 2020 06:23      Creatinine Trend: 1.15 <--, 1.27 <--, 1.22 <--, 0.98 <--, 1.30 <--                        16.1   6.82  )-----------( 196      ( 07 Dec 2020 06:23 )             47.1     Urine Studies:              PT/INR - ( 07 Dec 2020 06:46 )   PT: 21.0 sec;   INR: 1.80 ratio               Care Discussed with Consultants/Other Providers:

## 2020-12-07 NOTE — PROGRESS NOTE ADULT - SUBJECTIVE AND OBJECTIVE BOX
Pre-op Diagnosis:  Persistent atrial fibrillation  Newly diagnosed NICM with severely reduced LVEF, likely tachycardia mediated  ERAF after recent cardioversion    Post-op Diagnosis:  Same    Procedure:  Catheter ablation of persistent atrial fibrillation    Electrophysiologist:  SOLE Weiner MD    Assistant:  LEONEL Lawler MD    Anesthesia:  General anesthesia    Access:  RFV    Description:  Successful pulmonary vein isolation with conversion of atrial fibrillation to junctional rhythm during PVI ablation  Inducible mitral isthmus dependent atrial flutter   Mitral isthmus dependent atrial flutter converted to typical CTI dependent atrial flutter during lateral mitral isthmus line ablation  Conversion of atrial flutter to junctional rhythm during CTI ablation  Inducible atrial fibrillation now non-sustained and spontaneously converts to junctional rhythm, repeatedly    Complications:  None    EBL:  10 cc    Disposition:  Stable    Plan:  - Bedrest x 6 hrs then remove right groin suture   - Resume Rivaroxaban in 6 hours  - Start pantoprazole 40 mg PO daily  - Stop amiodarone and metoprolol

## 2020-12-07 NOTE — CHART NOTE - NSCHARTNOTEFT_GEN_A_CORE
pt is sp Afib ablation via RFV  right groin suture removed at 2000 as per protocol   manual pressure applied for 10 min  good hemostasis achieved  no bleeding or oozing at site, site soft non tender   patient to follow activity restrictions as per order  cont to keep monitoring site over night   cont to monitor on tele.

## 2020-12-07 NOTE — CHART NOTE - NSCHARTNOTEFT_GEN_A_CORE
Atrial Fibrillation Ablation  Last Name: Carlos    First Name: Shahbaz MR# [id number]         : 1965  Date of Procedure: 2020  : Ifeanyi Weiner [facility]   Assistant: Aleksander Lawler MD  Procedures performed:  1.	Successful Pulmonary Vein Isolation (52361)  2.	Additional procedures: ICE (81395)  3.	Additional procedures: 3D mapping (93456)  4.	Additional procedures: Additional ablation of 1st discrete arrhythmia (99376) (mitral annular flutter)  5.	Additional procedures: Additional ablation of 2nd discrete arrhythmia (90252) (CTI flutter)    Referring physician: Jeff Garrett MD  Preprocedure Diagnosis:   1.	Atrial Fibrillation Type: Late Persistent (I48.19)  2.	Symptoms: Dyspnea  3.	Symptoms: Fatigue  4.	Antiarrhythmic drugs failed or intolerance:  Amiodarone  5.	NYHA Class: III  6.	Left Atrial Volume Index: not reported but severely enlarged as was RA  7.	LVEF: <=30%  8.	Underlying heart disease: Non-ischemic Dilated Cardiomyopathy and moderate mitral regurgitation  9.	Non cardiac disease: None  10.	[id number]:  2  11.	Familial AF: No  Genetic testing: Not done  12.	Previous Ablation type: None    Post procedure diagnosis:  1.	Successful pulmonary vein isolation which terminated atrial fibrillation  2.	Easily inducible mitral annular flutter terminated with ablation with posterolateral line  3.	Ablation of inducible CTI flutter which terminated with ablation after which no atrial arrhythmias inducible.   4.	Severe bi-atrial enlargement  5.	Normal left and right atrial filling pressures at end of case  6.	Mild mitral regurgitation by color flow Doppler on ICE at end of case      3D mapping system: CARTO with CARTO technical support to operate CARTO V7 and Coherent Mapping  Imaging: ICE/EAM  Procedure Time:     190     minutes    Time to Transeptal:      65    minutes      LA Dwell time:    110  minutes  Anesthesia/Sedation: GET with EP Anesthesia   Esophageal Temperature monitoring:  Yes   Maximum temp increase 0.9 degree C  EBL: 50cc              I/O 1600/0 cc initially no Wu;    Complications: None  Fluoroscopy: 0 Minutes     Preprocedure CAMRYN: No   Implanted Device Present: None  Procedural Anticoagulation: 56792 Units IV heparin bolus and 2000 units/hour to keep -400  secs  Last dose OAC: Rivaroxaban 20 mg 18 hours pre procedure  Protamine 50 mg IV was given  at end of procedure  Brief history: 55  year old man with likely 4-5 months of atrial fibrillation that has resulted in a probably tachycardia induced cardiomyopathy with LVEF of 20% with moderate to severe mitral regurgitation. Coronary angiography with non obstructive disease. CAMRYN last week without KAVON thrombus and CV attempted with immediate recurrent AF. Has been on amiodarone and metoprolol to control heart rate since then.  Procedure details:  The patient was brought to the EP lab in the post absorptive nonsedated state after informed consent was obtained then prepped and draped in the usual manner. Formal time out performed. The sheaths were placed as following using Seldinger technique and the patient was anticoagulated. He was started on dobutamine from the beginning of the case.  Sheaths and Catheters:  RFV:  9F  Cordis  short sheath with a   BiosMediaVast  Soundstar catheter to the RA  RFV:  8.0F  Cordis  short  sheath with a   Biosense  7F decapolar  catheter to the CS  RFV:  8.5F  Abbott  SL0 Sheath with a   Biosense  7F Smart Touch FJ alternating with Pentaray catheter to the LA and RA  Arterial pressure monitoring: Noninvasive  Transeptal needle:  Mattaponi RF    Hemodynamics:   RA pressure initial: 11 Hg            LA pressure initial:  23 mm Hg      (AF)    RA pressure initial:  11mm Hg    LA pressure initial:  19mm Hg        (SR)  Baseline Rhythm: Atrial fibrillation with rapid response  The ICE catheter was advanced to the RA and a sound map of the LA, PV’s, mitral annulus, KAVON and esophagus was created after respiratory gating was performed. The ablation catheter was advanced to the RA (respiratory gating was redone if needed) and the catheter force was zeroed. A volumetric map of the SVC, RA, IVC and CS was created with the ablation catheter and used to place the CS catheter.   The ablation catheter was used to the fossa ovalis for the transeptal. The ablation catheter was replaced with an SL1 dilator within which the Mattaponi needle was loaded. Excellent tenting of the fossa was seen and transeptal puncture occurred without RF. Previous attempts were unsuccessful with the SL1 sheath and the SL0 sheath with the SL0 dilator due to the very enlarged RA which would not allow the sheath to remain stable on the septum. Once across the septum, the sheath was advanced and the dilator removed. The Pentaray catheter was advanced into the LA for High density mapping during atrial fibrillation. This showed no significant scarring or fibrosis of the LA even during AF at a voltage range of 0.2-0.5mV.   I proceeded to perform a wide PVI and after I finished the left veins and began on the right, he reverted to sinus. I completed the PVI.   PVI isolation confirmed by:  Pacing from veins and CS ?  Burst pacing at 200ms induced a left atrial tachycardia with a CL of 210 ms. Activation mapping and concealed entrainment showed this to be mitral annular flutter. I proceeded to ablate from the mitral annulus at 5 o’clock back to the LIPV and the rhythm terminated and immediately switched to CTI flutter confirmed by high density mapping and concealed entrainment.   I pulled back to the RA and ablated the CTI and the patient converted to sinus. Pacing confirmed bi-directional block across the CTI line.   At this point I was not able to induce any AT/AF or flutter by bursting at 180 to 200 ms . In fact I induced atrial fibrillation which reverted to sinus within 3 seconds. Ice showed no pericardial effusion. 50 mg IV protamine given and sheaths were pulled with purse string sutures placed for hemostasis.  Final rhythm:  Sinus  rate of   70 bpm   NH:  210  ms.  QRS:  89 ms  QT: 419   QTc:   450   Intervals:  AH interval:  110 ms  HV : 55 ms  AVNW 380 ms AVNERP : 600/320 no slow pathway  VA conduction: No     Conclusions: Successful pulmonary vein isolation and ablation of mitral annular flutter and CTI flutter with no arrhythmias inducible post ablation. Mitral regurgitation at the end of the procedure appeared mild. Left ventricular systolic function appeared in the 30-40%range. Full 2D and Doppler Echo will be done tomorrow.  He will need GDT to hopefully normalize his LV function if he remains in sinus.

## 2020-12-07 NOTE — PRE-ANESTHESIA EVALUATION ADULT - MALLAMPATI CLASS
Class III - visualization of the soft palate and the base of the uvula
Class III - visualization of the soft palate and the base of the uvula
pt has own belongings

## 2020-12-07 NOTE — PRE-ANESTHESIA EVALUATION ADULT - NSANTHOSAYNRD_GEN_A_CORE
No. LISSETTE screening performed.  STOP BANG Legend: 0-2 = LOW Risk; 3-4 = INTERMEDIATE Risk; 5-8 = HIGH Risk
No. LISSETTE screening performed.  STOP BANG Legend: 0-2 = LOW Risk; 3-4 = INTERMEDIATE Risk; 5-8 = HIGH Risk

## 2020-12-08 ENCOUNTER — TRANSCRIPTION ENCOUNTER (OUTPATIENT)
Age: 55
End: 2020-12-08

## 2020-12-08 VITALS
RESPIRATION RATE: 17 BRPM | DIASTOLIC BLOOD PRESSURE: 74 MMHG | SYSTOLIC BLOOD PRESSURE: 103 MMHG | HEART RATE: 63 BPM | TEMPERATURE: 98 F

## 2020-12-08 PROBLEM — Z78.9 OTHER SPECIFIED HEALTH STATUS: Chronic | Status: ACTIVE | Noted: 2020-12-01

## 2020-12-08 LAB
ANION GAP SERPL CALC-SCNC: 13 MMOL/L — SIGNIFICANT CHANGE UP (ref 5–17)
ANION GAP SERPL CALC-SCNC: 14 MMOL/L — SIGNIFICANT CHANGE UP (ref 5–17)
BUN SERPL-MCNC: 15 MG/DL — SIGNIFICANT CHANGE UP (ref 7–23)
BUN SERPL-MCNC: 16 MG/DL — SIGNIFICANT CHANGE UP (ref 7–23)
CALCIUM SERPL-MCNC: 8.7 MG/DL — SIGNIFICANT CHANGE UP (ref 8.4–10.5)
CALCIUM SERPL-MCNC: 9.1 MG/DL — SIGNIFICANT CHANGE UP (ref 8.4–10.5)
CHLORIDE SERPL-SCNC: 103 MMOL/L — SIGNIFICANT CHANGE UP (ref 96–108)
CHLORIDE SERPL-SCNC: 104 MMOL/L — SIGNIFICANT CHANGE UP (ref 96–108)
CO2 SERPL-SCNC: 21 MMOL/L — LOW (ref 22–31)
CO2 SERPL-SCNC: 22 MMOL/L — SIGNIFICANT CHANGE UP (ref 22–31)
CREAT SERPL-MCNC: 1.33 MG/DL — HIGH (ref 0.5–1.3)
CREAT SERPL-MCNC: 1.43 MG/DL — HIGH (ref 0.5–1.3)
GLUCOSE SERPL-MCNC: 122 MG/DL — HIGH (ref 70–99)
GLUCOSE SERPL-MCNC: 139 MG/DL — HIGH (ref 70–99)
HCT VFR BLD CALC: 47.8 % — SIGNIFICANT CHANGE UP (ref 39–50)
HGB BLD-MCNC: 15.6 G/DL — SIGNIFICANT CHANGE UP (ref 13–17)
MCHC RBC-ENTMCNC: 29.3 PG — SIGNIFICANT CHANGE UP (ref 27–34)
MCHC RBC-ENTMCNC: 32.6 GM/DL — SIGNIFICANT CHANGE UP (ref 32–36)
MCV RBC AUTO: 89.8 FL — SIGNIFICANT CHANGE UP (ref 80–100)
NRBC # BLD: 0 /100 WBCS — SIGNIFICANT CHANGE UP (ref 0–0)
PLATELET # BLD AUTO: 196 K/UL — SIGNIFICANT CHANGE UP (ref 150–400)
POTASSIUM SERPL-MCNC: 4.1 MMOL/L — SIGNIFICANT CHANGE UP (ref 3.5–5.3)
POTASSIUM SERPL-MCNC: 4.2 MMOL/L — SIGNIFICANT CHANGE UP (ref 3.5–5.3)
POTASSIUM SERPL-SCNC: 4.1 MMOL/L — SIGNIFICANT CHANGE UP (ref 3.5–5.3)
POTASSIUM SERPL-SCNC: 4.2 MMOL/L — SIGNIFICANT CHANGE UP (ref 3.5–5.3)
RBC # BLD: 5.32 M/UL — SIGNIFICANT CHANGE UP (ref 4.2–5.8)
RBC # FLD: 13.1 % — SIGNIFICANT CHANGE UP (ref 10.3–14.5)
SODIUM SERPL-SCNC: 138 MMOL/L — SIGNIFICANT CHANGE UP (ref 135–145)
SODIUM SERPL-SCNC: 139 MMOL/L — SIGNIFICANT CHANGE UP (ref 135–145)
WBC # BLD: 11.23 K/UL — HIGH (ref 3.8–10.5)
WBC # FLD AUTO: 11.23 K/UL — HIGH (ref 3.8–10.5)

## 2020-12-08 PROCEDURE — C1894: CPT

## 2020-12-08 PROCEDURE — 93312 ECHO TRANSESOPHAGEAL: CPT

## 2020-12-08 PROCEDURE — 93456 R HRT CORONARY ARTERY ANGIO: CPT

## 2020-12-08 PROCEDURE — C1893: CPT

## 2020-12-08 PROCEDURE — 93005 ELECTROCARDIOGRAM TRACING: CPT

## 2020-12-08 PROCEDURE — 99285 EMERGENCY DEPT VISIT HI MDM: CPT

## 2020-12-08 PROCEDURE — 84443 ASSAY THYROID STIM HORMONE: CPT

## 2020-12-08 PROCEDURE — 80061 LIPID PANEL: CPT

## 2020-12-08 PROCEDURE — C1887: CPT

## 2020-12-08 PROCEDURE — 92960 CARDIOVERSION ELECTRIC EXT: CPT

## 2020-12-08 PROCEDURE — 87635 SARS-COV-2 COVID-19 AMP PRB: CPT

## 2020-12-08 PROCEDURE — 93306 TTE W/DOPPLER COMPLETE: CPT | Mod: 26

## 2020-12-08 PROCEDURE — 93306 TTE W/DOPPLER COMPLETE: CPT

## 2020-12-08 PROCEDURE — 85027 COMPLETE CBC AUTOMATED: CPT

## 2020-12-08 PROCEDURE — 80048 BASIC METABOLIC PNL TOTAL CA: CPT

## 2020-12-08 PROCEDURE — 93655 ICAR CATH ABLTJ DSCRT ARRHYT: CPT

## 2020-12-08 PROCEDURE — 71275 CT ANGIOGRAPHY CHEST: CPT

## 2020-12-08 PROCEDURE — 36415 COLL VENOUS BLD VENIPUNCTURE: CPT

## 2020-12-08 PROCEDURE — 84436 ASSAY OF TOTAL THYROXINE: CPT

## 2020-12-08 PROCEDURE — 84480 ASSAY TRIIODOTHYRONINE (T3): CPT

## 2020-12-08 PROCEDURE — 96361 HYDRATE IV INFUSION ADD-ON: CPT

## 2020-12-08 PROCEDURE — C1730: CPT

## 2020-12-08 PROCEDURE — 84100 ASSAY OF PHOSPHORUS: CPT

## 2020-12-08 PROCEDURE — 83880 ASSAY OF NATRIURETIC PEPTIDE: CPT

## 2020-12-08 PROCEDURE — C1769: CPT

## 2020-12-08 PROCEDURE — 99152 MOD SED SAME PHYS/QHP 5/>YRS: CPT

## 2020-12-08 PROCEDURE — 85730 THROMBOPLASTIN TIME PARTIAL: CPT

## 2020-12-08 PROCEDURE — 96375 TX/PRO/DX INJ NEW DRUG ADDON: CPT

## 2020-12-08 PROCEDURE — 93656 COMPRE EP EVAL ABLTJ ATR FIB: CPT

## 2020-12-08 PROCEDURE — 80053 COMPREHEN METABOLIC PANEL: CPT

## 2020-12-08 PROCEDURE — 83735 ASSAY OF MAGNESIUM: CPT

## 2020-12-08 PROCEDURE — 80076 HEPATIC FUNCTION PANEL: CPT

## 2020-12-08 PROCEDURE — 71045 X-RAY EXAM CHEST 1 VIEW: CPT

## 2020-12-08 PROCEDURE — 93662 INTRACARDIAC ECG (ICE): CPT

## 2020-12-08 PROCEDURE — 85379 FIBRIN DEGRADATION QUANT: CPT

## 2020-12-08 PROCEDURE — 86850 RBC ANTIBODY SCREEN: CPT

## 2020-12-08 PROCEDURE — 86769 SARS-COV-2 COVID-19 ANTIBODY: CPT

## 2020-12-08 PROCEDURE — C1759: CPT

## 2020-12-08 PROCEDURE — 85025 COMPLETE CBC W/AUTO DIFF WBC: CPT

## 2020-12-08 PROCEDURE — 86900 BLOOD TYPING SEROLOGIC ABO: CPT

## 2020-12-08 PROCEDURE — 96374 THER/PROPH/DIAG INJ IV PUSH: CPT

## 2020-12-08 PROCEDURE — 99153 MOD SED SAME PHYS/QHP EA: CPT

## 2020-12-08 PROCEDURE — 96372 THER/PROPH/DIAG INJ SC/IM: CPT | Mod: XU

## 2020-12-08 PROCEDURE — 85610 PROTHROMBIN TIME: CPT

## 2020-12-08 PROCEDURE — 93010 ELECTROCARDIOGRAM REPORT: CPT

## 2020-12-08 PROCEDURE — 93613 INTRACARDIAC EPHYS 3D MAPG: CPT

## 2020-12-08 PROCEDURE — 84484 ASSAY OF TROPONIN QUANT: CPT

## 2020-12-08 PROCEDURE — 82550 ASSAY OF CK (CPK): CPT

## 2020-12-08 PROCEDURE — C1732: CPT

## 2020-12-08 PROCEDURE — 86901 BLOOD TYPING SEROLOGIC RH(D): CPT

## 2020-12-08 RX ORDER — RIVAROXABAN 15 MG-20MG
1 KIT ORAL
Qty: 30 | Refills: 1
Start: 2020-12-08 | End: 2021-02-05

## 2020-12-08 RX ORDER — LISINOPRIL 2.5 MG/1
2.5 TABLET ORAL AT BEDTIME
Refills: 0 | Status: DISCONTINUED | OUTPATIENT
Start: 2020-12-08 | End: 2020-12-08

## 2020-12-08 RX ORDER — ATORVASTATIN CALCIUM 80 MG/1
1 TABLET, FILM COATED ORAL
Qty: 30 | Refills: 0
Start: 2020-12-08 | End: 2021-01-06

## 2020-12-08 RX ORDER — SODIUM CHLORIDE 9 MG/ML
1000 INJECTION INTRAMUSCULAR; INTRAVENOUS; SUBCUTANEOUS
Refills: 0 | Status: DISCONTINUED | OUTPATIENT
Start: 2020-12-08 | End: 2020-12-08

## 2020-12-08 RX ORDER — LISINOPRIL 2.5 MG/1
1 TABLET ORAL
Qty: 30 | Refills: 0
Start: 2020-12-08 | End: 2021-01-06

## 2020-12-08 RX ORDER — SUCRALFATE 1 G
1 TABLET ORAL
Refills: 0 | Status: DISCONTINUED | OUTPATIENT
Start: 2020-12-08 | End: 2020-12-08

## 2020-12-08 RX ORDER — ACETAMINOPHEN 500 MG
1000 TABLET ORAL ONCE
Refills: 0 | Status: COMPLETED | OUTPATIENT
Start: 2020-12-08 | End: 2020-12-08

## 2020-12-08 RX ORDER — PANTOPRAZOLE SODIUM 20 MG/1
1 TABLET, DELAYED RELEASE ORAL
Qty: 30 | Refills: 0
Start: 2020-12-08 | End: 2021-01-06

## 2020-12-08 RX ORDER — BENZOCAINE AND MENTHOL 5; 1 G/100ML; G/100ML
1 LIQUID ORAL
Refills: 0 | Status: DISCONTINUED | OUTPATIENT
Start: 2020-12-08 | End: 2020-12-08

## 2020-12-08 RX ORDER — SPIRONOLACTONE 25 MG/1
25 TABLET, FILM COATED ORAL DAILY
Refills: 0 | Status: DISCONTINUED | OUTPATIENT
Start: 2020-12-08 | End: 2020-12-08

## 2020-12-08 RX ORDER — FUROSEMIDE 40 MG
1 TABLET ORAL
Qty: 0 | Refills: 0 | DISCHARGE

## 2020-12-08 RX ORDER — FUROSEMIDE 40 MG
1 TABLET ORAL
Qty: 30 | Refills: 0
Start: 2020-12-08 | End: 2021-01-06

## 2020-12-08 RX ORDER — SPIRONOLACTONE 25 MG/1
1 TABLET, FILM COATED ORAL
Qty: 30 | Refills: 0
Start: 2020-12-08 | End: 2021-01-06

## 2020-12-08 RX ORDER — METOPROLOL TARTRATE 50 MG
50 TABLET ORAL DAILY
Refills: 0 | Status: DISCONTINUED | OUTPATIENT
Start: 2020-12-08 | End: 2020-12-08

## 2020-12-08 RX ORDER — METOPROLOL TARTRATE 50 MG
1 TABLET ORAL
Qty: 0 | Refills: 0 | DISCHARGE

## 2020-12-08 RX ORDER — METOPROLOL TARTRATE 50 MG
1 TABLET ORAL
Qty: 30 | Refills: 0
Start: 2020-12-08 | End: 2021-01-06

## 2020-12-08 RX ADMIN — Medication 1 GRAM(S): at 06:06

## 2020-12-08 RX ADMIN — Medication 1000 MILLIGRAM(S): at 04:55

## 2020-12-08 RX ADMIN — Medication 400 MILLIGRAM(S): at 04:25

## 2020-12-08 RX ADMIN — SPIRONOLACTONE 25 MILLIGRAM(S): 25 TABLET, FILM COATED ORAL at 11:37

## 2020-12-08 RX ADMIN — BENZOCAINE AND MENTHOL 1 LOZENGE: 5; 1 LIQUID ORAL at 04:24

## 2020-12-08 RX ADMIN — Medication 20 MILLIGRAM(S): at 06:06

## 2020-12-08 RX ADMIN — Medication 30 MILLILITER(S): at 04:24

## 2020-12-08 RX ADMIN — PANTOPRAZOLE SODIUM 40 MILLIGRAM(S): 20 TABLET, DELAYED RELEASE ORAL at 06:06

## 2020-12-08 RX ADMIN — Medication 50 MILLIGRAM(S): at 06:06

## 2020-12-08 NOTE — PROGRESS NOTE ADULT - SUBJECTIVE AND OBJECTIVE BOX
24H hour events: s/p afib ablation, cough and sore throat overnight, improved with tylenol and cepacol. Creatinine elevated this morning, receiving IV hydration and will recheck labs post IVF.    MEDICATIONS:  furosemide    Tablet 20 milliGRAM(s) Oral daily  lisinopril 2.5 milliGRAM(s) Oral at bedtime  metoprolol succinate ER 50 milliGRAM(s) Oral daily  rivaroxaban 20 milliGRAM(s) Oral with dinner  spironolactone 25 milliGRAM(s) Oral daily  ceFAZolin   IVPB 2000 milliGRAM(s) IV Intermittent once  pantoprazole    Tablet 40 milliGRAM(s) Oral before breakfast  atorvastatin 10 milliGRAM(s) Oral at bedtime      REVIEW OF SYSTEMS:  See HPI, otherwise ROS negative.    PHYSICAL EXAM:  T(C): 36.7 (12-08-20 @ 05:07), Max: 37.3 (12-07-20 @ 14:00)  HR: 65 (12-08-20 @ 05:07) (55 - 65)  BP: 100/66 (12-08-20 @ 05:07) (91/64 - 117/60)  RR: 17 (12-08-20 @ 05:07) (16 - 20)  SpO2: 96% (12-08-20 @ 05:07) (93% - 100%)  Wt(kg): --  I&O's Summary    07 Dec 2020 07:01  -  08 Dec 2020 07:00  --------------------------------------------------------  IN: 400 mL / OUT: 900 mL / NET: -500 mL    08 Dec 2020 07:01  -  08 Dec 2020 10:04  --------------------------------------------------------  IN: 240 mL / OUT: 0 mL / NET: 240 mL        Appearance: Alert. NAD	  Cardiovascular: +S1S2 RRR no m/g/r  Respiratory: CTA B/L		  Skin: Right groin site flat, no erythema, edema or hematoma. Small area of ecchymosis.  Extremities: No edema BLE  Vascular: Peripheral pulses palpable 2+ bilaterally      LABS:	 	    CBC Full  -  ( 08 Dec 2020 01:21 )  WBC Count : 11.23 K/uL  Hemoglobin : 15.6 g/dL  Hematocrit : 47.8 %  Platelet Count - Automated : 196 K/uL  Mean Cell Volume : 89.8 fl  Mean Cell Hemoglobin : 29.3 pg  Mean Cell Hemoglobin Concentration : 32.6 gm/dL  Auto Neutrophil # : x  Auto Lymphocyte # : x  Auto Monocyte # : x  Auto Eosinophil # : x  Auto Basophil # : x  Auto Neutrophil % : x  Auto Lymphocyte % : x  Auto Monocyte % : x  Auto Eosinophil % : x  Auto Basophil % : x    12-08    138  |  103  |  15  ----------------------------<  122<H>  4.2   |  22  |  1.33<H>  12-08    139  |  104  |  16  ----------------------------<  139<H>  4.1   |  21<L>  |  1.43<H>    Ca    9.1      08 Dec 2020 09:09  Ca    8.7      08 Dec 2020 01:21    TELEMETRY: Normal sinus rhythm 60's bpm  	    ECG: NSR, 67 bpm

## 2020-12-08 NOTE — PROGRESS NOTE ADULT - ASSESSMENT
56 y/o male with no pertinent PMHx presented to [facility] from [facility]gent [facility] by EMS c/o SOB x 1 week. In the ED was found to have new onset Afib w/ RVR up to 150bpm. CT with small b/l pleural effusions. TTE revealed severe LV systolic dysfunction, mod/severe MR, mild pulm HTN as well as segmental wall motion abnormalities. Pt endorses increase in alcohol use over the past year as well as increased caffeine use. Patient was initially on a cardizem gtt, discontinued on 12/2 and started on metoprolol. Cath on 12/2 revealed 30-40% stenosis, no interventions. Pt endorses resolution of shortness of breath. Now s/p successful afib ablation on 12/7, remains in NSR.    1. Atrial Fibrillation Ablation (12/7)  -Remains in normal sinus rhythm  -Continue Metoprolol 50mg XL  -Continue Xarelto 20mg  -Follow Up Appointment at the EP clinic with Dr. Weiner on 1/20/2021 at 2:45 pm    2. Newly diagnosed NICM with reduced LVEF  -Start Spironolactone 25mg daily  -Start Lisinopril 2.5 mg daily    3. DARRICK  -Creatinine downtrended to 1.33 today    Cleared for discharge from EP perspective.    Hayley Smith PA-C  #[id number]

## 2020-12-08 NOTE — DISCHARGE NOTE PROVIDER - NSDCCPTREATMENT_GEN_ALL_CORE_FT
PRINCIPAL PROCEDURE  Procedure: Cardiac ablation  Findings and Treatment: Successful pulmonary vein isolation with conversion of atrial fibrillation to junctional rhythm during PVI ablation  Inducible mitral isthmus dependent atrial flutter   Mitral isthmus dependent atrial flutter converted to typical CTI dependent atrial flutter during lateral mitral isthmus line ablation  Conversion of atrial flutter to junctional rhythm during CTI ablation  Inducible atrial fibrillation now non-sustained and spontaneously converts to junctional rhythm, repeatedly  Complications:  None  EBL:  10 cc  Disposition:  Stable

## 2020-12-08 NOTE — DISCHARGE NOTE PROVIDER - CARE PROVIDER_API CALL
Rosette Weiner (MD; PhD)  Cardiac Electrophysiology; Cardiovascular Disease; Internal Medicine  [facility]  Dept of Cardiology, [address] [place] [address]  Phone: [phone]  Fax: [phone]  Established Patient  Scheduled Appointment: 12/23/2020 09:00 AM

## 2020-12-08 NOTE — DISCHARGE NOTE PROVIDER - NSDCMRMEDTOKEN_GEN_ALL_CORE_FT
Lasix 20 mg oral tablet: 1 tab(s) orally once a day  HOSP  Metoprolol Tartrate 50 mg oral tablet: 1 tab(s) orally every 6 hours  HOSP   atorvastatin 10 mg oral tablet: 1 tab(s) orally once a day (at bedtime)  furosemide 20 mg oral tablet: 1 tab(s) orally once a day  lisinopril 2.5 mg oral tablet: 1 tab(s) orally once a day (at bedtime)  metoprolol succinate 50 mg oral tablet, extended release: 1 tab(s) orally once a day  pantoprazole 40 mg oral delayed release tablet: 1 tab(s) orally once a day (before a meal)  rivaroxaban 20 mg oral tablet: 1 tab(s) orally once a day (before a meal)  spironolactone 25 mg oral tablet: 1 tab(s) orally once a day

## 2020-12-08 NOTE — DISCHARGE NOTE PROVIDER - NSDCFUADDINST_GEN_ALL_CORE_FT
you may return to work  no vigorous exercise until you are seen and cleared by Dr Weiner.  INSTRUCTIONS AS PER PRE PRINTED SHEETS

## 2020-12-08 NOTE — DISCHARGE NOTE PROVIDER - PROVIDER TOKENS
PROVIDER:[TOKEN:[[id number]],SCHEDULEDAPPT:[12/23/2020],SCHEDULEDAPPTTIME:[09:00 AM],ESTABLISHEDPATIENT:[T]]

## 2020-12-08 NOTE — DISCHARGE NOTE PROVIDER - HOSPITAL COURSE
56 y/o male  (no implantable devices) with no pertinent PMHx presented to [facility] from Urgent Care by EMS c/o SOB x 1 week. Pt states he started having difficulty breathing while at rest. EMS gave adenosine with no effect. In the ED was found to have new onset Afib w/ RVR with max HR at 150. CTA was done to r/o PE- D-Dimer was mildly elevated;  and found small B/L pleural effusions. He was started on Cardizem, had ECHO performed -severe LV systolic dysfunction, mod/severe MR, mild pulm HTN. In some views, there appears to be segmental wall motion abnormalities. Cardizem was stopped  and pt transitioned metoprolol. He transferred to [facility] today for right and left heart cath. 12/2/20202: cath revealed ORONARY VESSELS: The coronary circulation is right dominant. LM:   --  LM: Normal. LAD:   --  Proximal LAD: There was a 30 % stenosis.--  Mid LAD: There was a 30 % stenosis.-  D1: There was a 40 % stenosis. CX:   --  Circumflex: Normal. RCA:   --  RCA: Angiography showed mild atherosclerosis with no flow limiting lesions.              54 y/o male  (no implantable devices) with no pertinent PMHx presented to [facility] ED from Urgent Care by EMS c/o SOB x 1 week. Pt states he started having difficulty breathing while at rest. EMS gave adenosine with no effect. In the ED was found to have new onset Afib w/ RVR with max HR at 150. CTA was done to r/o PE- D-Dimer was mildly elevated;  and found small B/L pleural effusions. He was started on Cardizem, had ECHO performed -severe LV systolic dysfunction, mod/severe MR, mild pulm HTN. In some views, there appears to be segmental wall motion abnormalities. Cardizem was stopped  and pt transitioned metoprolol. He transferred to [facility] today for right and left heart cath. 12/2/20202: cath revealed ORONARY VESSELS: The coronary circulation is right dominant. LM:   --  LM: Normal. LAD:   --  Proximal LAD: There was a 30 % stenosis.--  Mid LAD: There was a 30 % stenosis.-  D1: There was a 40 % stenosis. CX:   --  Circumflex: Normal. RCA:   --  RCA: Angiography showed mild atherosclerosis with no flow limiting lesions. 12/7/2020 pt underwent PVI/mitral/CTI ablation of atrial fibrillation. Post procedure TTE on 12/8/2020 with resumption of sinus rhythm revealed an improved ejection fraction of 35-40%.

## 2020-12-08 NOTE — CHART NOTE - NSCHARTNOTEFT_GEN_A_CORE
Pt reports dry cough and throat, chest discomfort and back discomfort from lying in the bed.      Groin site stable, pt walking with no pain.    S/P PVI Ablation    Plan:  Cepacol lozenges ordered q 6H prn give now  Tylenol 1gram IV now  Maalox 30cc oral now x 1  Carafate 1gram slurry q 6H ordered  Check ECG  Tele NSR 74  Continue to monitor    Kizzy Calhoun [facility]-BC  Cardiology

## 2020-12-08 NOTE — DISCHARGE NOTE PROVIDER - NSDCCPCAREPLAN_GEN_ALL_CORE_FT
PRINCIPAL DISCHARGE DIAGNOSIS  Diagnosis: New onset atrial fibrillation  Assessment and Plan of Treatment: WOUND CARE:  The day AFTER your procedure  - Remove the bandage at the site GENTLY, clean with mild soap and water, and pat dry; leave open to air  - You may shower   - DO NOT apply lotions, creams, ointments, powder, parfumes to your incision site  -Check your groin every day. A small amount of bruising or soarness is normal, a bump ( smaller than nickel) might be present, normal  - DO NOT SOAK your site for 1 week ( no baths, no pools, no tubs, etc..)  ACTIVITY:  YOur procedure was done through your groin  for the next 5 DAYS:  - Limit climbing stairs, no strenous activity, pushing , pulling, or straining   DO NOT LIFT anything 10 lbs or heavier   you may resume sexual activity in 7 days, unless instructed otherwise  mild palpitations are normal   Follow heart healthy diet reccomended by your doctor, , if you smoke STOP SMOKING ( may call [phone] for [place] of tobacco control if you need assistance).  for the next 24 hours:   - stay at home and rest, do not drive or operate heavy [place]   do not drink alcoholic beverages   do not make important personal or business decisions   ***CALL YOUR DOCTOR ***  IF you have fever, chils, body aches, or severe pain, swelling, redness, heat, yellow drainage from your incision site  IF bleeding  or significant new swelling from your puncture site  IF you experience rapid heartbeat or palpitations that cause: lightheadness, dizziness, or fainting spell.  If you eperience difficulty swallowing, or pain with swallowing   IF unable to ge tin contact with yout doctor, you may call the Cardiology Office at [facility] at [phone]        SECONDARY DISCHARGE DIAGNOSES  Diagnosis: Pulmonary congestion  Assessment and Plan of Treatment:

## 2020-12-09 PROBLEM — Z00.00 ENCOUNTER FOR PREVENTIVE HEALTH EXAMINATION: Status: ACTIVE | Noted: 2020-12-09

## 2020-12-10 DIAGNOSIS — R07.9 CHEST PAIN, UNSPECIFIED: ICD-10-CM

## 2021-01-19 RX ORDER — METOPROLOL SUCCINATE 50 MG/1
50 TABLET, EXTENDED RELEASE ORAL
Qty: 90 | Refills: 3 | Status: ACTIVE | COMMUNITY

## 2021-01-19 RX ORDER — ATORVASTATIN CALCIUM 10 MG/1
10 TABLET, FILM COATED ORAL
Qty: 30 | Refills: 0 | Status: ACTIVE | COMMUNITY

## 2021-01-19 RX ORDER — LISINOPRIL 2.5 MG/1
2.5 TABLET ORAL DAILY
Qty: 30 | Refills: 3 | Status: ACTIVE | COMMUNITY

## 2021-01-19 RX ORDER — SPIRONOLACTONE 25 MG/1
25 TABLET ORAL DAILY
Qty: 30 | Refills: 0 | Status: ACTIVE | COMMUNITY

## 2021-01-19 RX ORDER — FUROSEMIDE 20 MG/1
20 TABLET ORAL DAILY
Qty: 10 | Refills: 0 | Status: ACTIVE | COMMUNITY

## 2021-01-19 RX ORDER — PANTOPRAZOLE 40 MG/1
40 TABLET, DELAYED RELEASE ORAL
Refills: 0 | Status: ACTIVE | COMMUNITY

## 2021-01-20 ENCOUNTER — APPOINTMENT (OUTPATIENT)
Dept: ELECTROPHYSIOLOGY | Facility: CLINIC | Age: 56
End: 2021-01-20
Payer: COMMERCIAL

## 2021-01-20 ENCOUNTER — NON-APPOINTMENT (OUTPATIENT)
Age: 56
End: 2021-01-20

## 2021-01-20 VITALS
BODY MASS INDEX: 26.92 KG/M2 | WEIGHT: 212 LBS | HEART RATE: 69 BPM | SYSTOLIC BLOOD PRESSURE: 142 MMHG | DIASTOLIC BLOOD PRESSURE: 85 MMHG | HEIGHT: 74.5 IN | OXYGEN SATURATION: 96 %

## 2021-01-20 PROCEDURE — 99213 OFFICE O/P EST LOW 20 MIN: CPT

## 2021-01-20 PROCEDURE — 93000 ELECTROCARDIOGRAM COMPLETE: CPT

## 2021-01-20 PROCEDURE — 99072 ADDL SUPL MATRL&STAF TM PHE: CPT

## 2021-01-20 NOTE — PHYSICAL EXAM
[General Appearance - Well Developed] : well developed [General Appearance - Well Nourished] : well nourished [Normal Conjunctiva] : the conjunctiva exhibited no abnormalities [Eyelids - No Xanthelasma] : the eyelids demonstrated no xanthelasmas [Normal Jugular Venous V Waves Present] : normal jugular venous V waves present [No Jugular Venous Kent A Waves] : no jugular venous kent A waves [Auscultation Breath Sounds / Voice Sounds] : lungs were clear to auscultation bilaterally [Heart Sounds] : normal S1 and S2 [Lungs Percussion] : the lungs were normal to percussion [Murmurs] : no murmurs present [Arterial Pulses Normal] : the arterial pulses were normal [Edema] : no peripheral edema present [Bowel Sounds] : normal bowel sounds [Abdomen Soft] : soft [Abnormal Walk] : normal gait [Gait - Sufficient For Exercise Testing] : the gait was sufficient for exercise testing [Nail Clubbing] : no clubbing of the fingernails [Cyanosis, Localized] : no localized cyanosis [Oriented To Time, Place, And Person] : oriented to person, place, and time [Skin Color & Pigmentation] : normal skin color and pigmentation [Affect] : the affect was normal [Mood] : the mood was normal [FreeTextEntry1] : NCAT

## 2021-01-20 NOTE — HISTORY OF PRESENT ILLNESS
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  During his hospitalization, coronary angiography showed non obstructive disease.  No KAVON thrombus was seen via CAMRYN, and an attempted CV resulted in immediate recurrence of AF.  He was started on Amiodarone and Metoprolol for rate control, and is now s/p successful pulmonary vein isolation and ablation of mitral annular flutter and CTI flutter on 12/7/20, with no inducible arrhythmias post-ablation.  A post-ablation echocardiogram performed the following day (12/8/20), revealed a recovering EF of 32% and moderate-severe mitral regurgitation.  He presents to our office today for a routine follow-up evaluation. \par He has no symptoms of dizziness, dyspnea or palpitations. He is NYHA Class 1. He could not refill his medications except for Xarelto. \par \par

## 2021-04-09 ENCOUNTER — APPOINTMENT (OUTPATIENT)
Dept: CARDIOLOGY | Facility: CLINIC | Age: 56
End: 2021-04-09
Payer: COMMERCIAL

## 2021-04-09 DIAGNOSIS — I51.9 HEART DISEASE, UNSPECIFIED: ICD-10-CM

## 2021-04-09 DIAGNOSIS — I48.91 UNSPECIFIED ATRIAL FIBRILLATION: ICD-10-CM

## 2021-04-09 DIAGNOSIS — R06.02 SHORTNESS OF BREATH: ICD-10-CM

## 2021-04-09 PROCEDURE — 93306 TTE W/DOPPLER COMPLETE: CPT

## 2021-04-09 PROCEDURE — 99072 ADDL SUPL MATRL&STAF TM PHE: CPT

## 2021-05-16 PROBLEM — I51.9 LEFT VENTRICULAR DYSFUNCTION: Status: ACTIVE | Noted: 2021-01-19

## 2021-05-16 PROBLEM — I48.91 ATRIAL FIBRILLATION: Status: ACTIVE | Noted: 2021-01-19

## 2021-05-16 PROBLEM — R06.02 SHORTNESS OF BREATH: Status: ACTIVE | Noted: 2021-01-19

## 2021-05-17 ENCOUNTER — APPOINTMENT (OUTPATIENT)
Dept: CV DIAGNOSITCS | Facility: HOSPITAL | Age: 56
End: 2021-05-17

## 2021-06-10 RX ORDER — RIVAROXABAN 20 MG/1
20 TABLET, FILM COATED ORAL DAILY
Qty: 90 | Refills: 3 | Status: ACTIVE | COMMUNITY
Start: 1900-01-01 | End: 1900-01-01

## 2021-07-30 ENCOUNTER — OUTPATIENT (OUTPATIENT)
Dept: OUTPATIENT SERVICES | Facility: HOSPITAL | Age: 56
LOS: 1 days | End: 2021-07-30
Payer: COMMERCIAL

## 2021-07-30 ENCOUNTER — APPOINTMENT (OUTPATIENT)
Dept: CV DIAGNOSITCS | Facility: HOSPITAL | Age: 56
End: 2021-07-30

## 2021-07-30 DIAGNOSIS — I51.9 HEART DISEASE, UNSPECIFIED: ICD-10-CM

## 2021-07-30 PROCEDURE — 93306 TTE W/DOPPLER COMPLETE: CPT | Mod: 26

## 2021-07-30 PROCEDURE — 93306 TTE W/DOPPLER COMPLETE: CPT

## 2021-10-15 ENCOUNTER — APPOINTMENT (OUTPATIENT)
Dept: ELECTROPHYSIOLOGY | Facility: CLINIC | Age: 56
End: 2021-10-15

## 2023-10-25 NOTE — DISCUSSION/SUMMARY
General Surgery Progress Note    Subjective:   Pt seen and examined earlier this morning. Pain well tolerated. Denies nausea/vomiting, had one hiccup during exam however pt otherwise denies recent hiccupping or belching. Passing flatus, no BM yet.    Objective:   Visit Vitals  /74 (BP Location: RUE - Right upper extremity, Patient Position: Sitting)   Pulse (!) 102   Temp 98.2 °F (36.8 °C) (Oral)   Resp 18   Ht 5' 9\" (1.753 m)   Wt 91.4 kg (201 lb 8 oz)   LMP  (LMP Unknown)   SpO2 96%   BMI 29.76 kg/m²       Awake and alert, no acute distress  Abdomen soft, mildly distended, appropriate incisional tenderness  Staples and nylon sutures intact    CBC:   Recent Labs     10/25/23  0622   WBC 10.3   RBC 3.00*   HGB 8.5*   HCT 26.3*   MCV 87.7        CMP:    Recent Labs     10/25/23  0622   SODIUM 138   POTASSIUM 3.3*   CHLORIDE 106   CO2 24   BUN 15   CREATININE 0.72   GLUCOSE 134*   TOTPROTEIN 6.2*   ALBUMIN 1.7*   CALCIUM 9.0   BILIRUBIN 0.4   ALKPT 162*   AST 24   GPT 16         Assessment and Plan:   Gladys Paul is a 67 year old female with PMH anemia, HLD, HTN, sleep apnea and PSH hysterectomy and loop ileostomy s/p exploratory laparotomy, lysis of adhesions, and ileostomy reversal 10/19, awaiting return of bowel function     - Continue NPO status with ice chips, sips, and hard candies  - Continue TPN in the setting prolonged NPO status and electrolyte dysregulation 2/2 high output ileostomy prior to takedown  - Okay for prophylactic anticoagulation from surgical standpoint  - Encourage incentive spirometer, out of bed to chair and ambulation as tolerated  - Remainder of care per primary    Amberly Valdez PA-C    Discussed with Dr. Garsia.            [FreeTextEntry1] : By exam his carotid upstrokes have normalized. He is functional Class 1. He remains in sinus. He is still on Xarelto. I will repeat his echo and if normalized, He does not need to be on vasodilators or beta blockers any longer. He will stay on Xarelto for at least 3 months. I

## 2024-09-17 ENCOUNTER — EMERGENCY (EMERGENCY)
Facility: HOSPITAL | Age: 59
LOS: 1 days | Discharge: HOME | End: 2024-09-17
Admitting: EMERGENCY MEDICINE
Payer: SELF-PAY

## 2024-09-17 VITALS
DIASTOLIC BLOOD PRESSURE: 75 MMHG | WEIGHT: 199.96 LBS | OXYGEN SATURATION: 95 % | HEIGHT: 72 IN | TEMPERATURE: 98 F | RESPIRATION RATE: 18 BRPM | SYSTOLIC BLOOD PRESSURE: 115 MMHG | HEART RATE: 81 BPM

## 2024-09-17 VITALS
RESPIRATION RATE: 16 BRPM | DIASTOLIC BLOOD PRESSURE: 81 MMHG | TEMPERATURE: 98 F | OXYGEN SATURATION: 96 % | SYSTOLIC BLOOD PRESSURE: 132 MMHG | HEART RATE: 59 BPM

## 2024-09-17 DIAGNOSIS — R11.0 NAUSEA: ICD-10-CM

## 2024-09-17 DIAGNOSIS — F19.129 OTHER PSYCHOACTIVE SUBSTANCE ABUSE WITH INTOXICATION, UNSPECIFIED: ICD-10-CM

## 2024-09-17 PROCEDURE — 99284 EMERGENCY DEPT VISIT MOD MDM: CPT

## 2024-09-17 RX ORDER — ONDANSETRON 2 MG/ML
4 INJECTION, SOLUTION INTRAMUSCULAR; INTRAVENOUS ONCE
Refills: 0 | Status: COMPLETED | OUTPATIENT
Start: 2024-09-17 | End: 2024-09-17

## 2024-09-17 RX ADMIN — ONDANSETRON 4 MILLIGRAM(S): 2 INJECTION, SOLUTION INTRAMUSCULAR; INTRAVENOUS at 18:52

## 2024-09-17 RX ADMIN — ONDANSETRON 4 MILLIGRAM(S): 2 INJECTION, SOLUTION INTRAMUSCULAR; INTRAVENOUS at 21:15

## 2024-09-17 NOTE — ED PROVIDER NOTE - MDM ORDERS SUBMITTED SELECTION
Writer calls patient and relays result notes from provider. Patient states that she disagrees with radiology. Patient states that she believes there is over distension in her abdomen and that that is what is causing her abdominal pain. Patient states she previously completed her steroid taper. Patient states she believes that is why there was no inflammation. Patient has no additional questions at this time.        Medications

## 2024-09-17 NOTE — ED ADULT NURSE NOTE - NSFALLUNIVINTERV_ED_ALL_ED
Bed/Stretcher in lowest position, wheels locked, appropriate side rails in place/Call bell, personal items and telephone in reach/Instruct patient to call for assistance before getting out of bed/chair/stretcher/Non-slip footwear applied when patient is off stretcher/[place] to call system/Physically safe environment - no spills, clutter or unnecessary equipment/Purposeful proactive rounding/Room/bathroom lighting operational, light cord in reach

## 2024-09-17 NOTE — ED PROVIDER NOTE - NSFOLLOWUPINSTRUCTIONS_ED_ALL_ED_FT
Illegal Drug Use Information, Adult  Illegal drugs are chemicals and substances that are illegal to use, sell, or have (possess). Health care providers and pharmacies do not use or carry these types of drugs to treat medical problems because they can cause serious side effects and can lead to death. Examples of illegal drugs include:  Cocaine or crack.  Meth (methamphetamine) or crystal meth.  "Bath salts" (synthetic cathinones).  Heroin.  LSD or acid.  PCP (phencyclidine).  Ecstasy.  What is drug dependence?  Using illegal drugs often leads to dependence or addiction. When you use certain drugs over a long period of time, your brain chemistry changes so that you can no longer function normally without that drug. This is called drug dependence. Drug dependence can cause you to:  Have unpleasant feelings and physical problems when you stop using the drug (withdrawal).  Be unable to perform at work or at home, or do the activities you used to do, without using the drug.  Some drugs make people feel so good that they want to use the drug again and again. This is called drug addiction. People who are addicted spend a lot of time seeking out the drug so that they can get the feeling they want from it. Addiction and dependence can be very hard to overcome.    How can illegal drug use and dependence affect me?  Using an illegal drug only once can have a major impact on your life. It is possible to die from side effects after using a drug just once. If you use an illegal drug repeatedly, you may need to take larger and larger doses of the drug to experience the feelings you want. Drug dependency and addiction may lead to:  Being unable to care for yourself and others.  Trouble with finances due to using your money for drugs.  Withdrawal, if you stop using the drug.  Negative effects on your relationships and work performance. It causes others not to trust you. If you have children, you could lose custody of them.  Behavior problems, such as:  Behaving in ways that do not match your values.  Lying and crime, such as stealing.  longterm or shelter. This can affect your ability to find a good job or continue your education.  Health problems such as tooth loss, skin problems, heart and lung disease, and stomach problems.  A drug overdose. This is a dangerous situation that requires hospitalization and often leads to death.  If you are a woman, you may have problems with your pregnancy, including:  Losing the pregnancy early (miscarriage), early delivery (premature birth), or delivering a lifeless infant (stillbirth).  Slow or abnormal growth (birth defects) of your unborn child.  Giving birth to a  who is addicted to illegal drugs.  What actions can I take to avoid illegal drug use?  Two people talking with a counselor.  To avoid using illegal drugs:  Find healthy ways to cope with stress, such as exercise, meditation, or spending time with family and friends. Talk with your health care provider about how you feel and how to cope with stress.  Spend time with people who do not use illegal drugs, or make new friends who do not use drugs.  Do something else instead of using drugs. You can exercise, take up a hobby, or participate in activities that you can do with others.  Do not be afraid to say no if someone offers you an illegal drug. Speak up about why you do not want to use drugs. You can be a positive role model for others.  Work with a health care provider or counselor to create a program for yourself to help you deal with various aspects of drug use or addiction.  Where to find more information  You can find more information about illegal drug use, dependence, and addiction from:  Your health care provider or mental health counselor.  Narcotics Anonymous: www.na.org  Substance Abuse and Mental Health Services Administration (SAMHSA):  Treatment finder: https://www.samhsa.gov/find-help  National helpline: 1-660-320-HELP (4154)  Contact a health care provider if:  You use illegal drugs and you want help to change your addictive behavior.  You lose interest in things you used to enjoy, like hobbies or family activities.  Your eating or sleeping habits change as a result of drug use.  You use medicine to get the same effects as a drug. This is illegal use and can become addictive as well.  You stopped illegal drug use previously and you start actively using it again (relapse).  Get help right away if:  You have thoughts about hurting yourself or others.  If you ever feel like you may hurt yourself or others, or have thoughts about taking your own life, get help right away. Go to your nearest emergency department or:  Call your local emergency services (911 in the [place]  Call a suicide crisis helpline, such as the National Suicide Prevention Lifeline at 1-926.651.2227 or 116 in the [place] This is open 24 hours a day in the [place]  If you’re a [place]:  Call 988 and press 1. This is open 24 hours a day.  Text the Veterans Crisis Line at 155792.  Summary  Illegal drugs are chemicals and substances that are illegal to use, sell, or possess.  Using illegal drugs often leads to dependence or addiction. Addiction and dependence can be very hard to overcome.  If you use illegal drugs, look for resources to help you quit and find healthy ways to cope with stress.  This information is not intended to replace advice given to you by your health care provider. Make sure you discuss any questions you have with your health care provider.

## 2024-09-17 NOTE — ED ADULT TRIAGE NOTE - PRO INTERPRETER NEED 2
Met with patient at bedside. Patient plans to discharge home with home care services. Lists of providers were offered, pt states he previously used [facility] and would like to restart services with them; referral made to [facility]. They will check patient's benefits and call back with costs in the event the patient is discharged home on IV antibiotics; patient currently on Invanz Q24. Will need home care orders prior to discharge. Wound vac order in process. Enid More, MSW, LSW [phone]    The Plan for Transition of Care is related to the following treatment goals: discharge planning when medically cleared    The Patient and/or patient representative Chris Cook was provided with a choice of provider and agrees with the discharge plan. [x] Yes [] No    Freedom of choice list was provided with basic dialogue that supports the patient's individualized plan of care/goals, treatment preferences and shares the quality data associated with the providers.  [x] Yes [] No English other

## 2024-09-17 NOTE — ED PROVIDER NOTE - PATIENT PORTAL LINK FT
You can access the FollowMyHealth Patient Portal offered by [facility] by registering at the following website: [email or web address]. By joining [facility]’s FollowMyHealth portal, you will also be able to view your health information using other applications (apps) compatible with our system.

## 2024-09-17 NOTE — ED PROVIDER NOTE - NS ED ROS FT
+nausea w/o vomiting  denies falls, trauma.   denies cp, sob  denies msk injury  limited 2/2 intoxication

## 2024-09-17 NOTE — ED PROVIDER NOTE - OBJECTIVE STATEMENT
59-year-old male, no significant past medical history, presenting to the ED complaining of altered mental state after ingesting mushroom chocolate candy.  Patient unable to explain how much he had taken but does endorse feeling some mild nausea without vomiting.  Denies chest pain, shortness of breath, headache, dizziness, back pain or urinary symptoms.

## 2024-09-17 NOTE — ED PROVIDER NOTE - CLINICAL SUMMARY MEDICAL DECISION MAKING FREE TEXT BOX
59-year-old male, no significant past medical history, presenting to the ED complaining of altered mental state after ingesting mushroom chocolate candy.  No red flags on exam and patient is otherwise somnolent but arousable and answers questions.  The majority of history obtained by friend that is currently in the ED with the patient.  Will plan to give p.o. Zofran for nausea and observe patient for clinical sobriety.  Dispo pending clinical improvement

## 2024-10-13 NOTE — PROGRESS NOTE ADULT - ASSESSMENT
54 y/o male with no pertinent PMHx presents to the ED BIBEMS from Urgent Care c/o SOB x few days. Pt states he started having difficulty breathing ~ 11/23 night.  no chest pains. Pt with persistent mild dyspnea x past ~ 8d. Pt went to Urgent Care today, EKG showed to have rapid heart beat. Per EMS, urgent care treated pt as SVT, pt given 6-12-12 Adenosine with no resolutoin of sx. Pt with  mild palpitations last 2 d. NO cough , congestion, fevers, peripheral edema.he says for last 1 year he had irregular heart beat at times but sought no TT for it no w up done. No known COVID contacts / prior infxn . pt does not smoke or take any illicit drugs. No other complaints at this time. NKDA. PCP: Kvng gunter last seen 8 yrs back  patient states that lately he has started drinking alcohol a lot and takes lot of coffee. lives alone, has a business importing flowers, has one daughter who lives in city  admitted for  ac respiratory distress   AFIB with RVR  chf systolic  transaminitis  started on cardizem, cardio consult noted-tart diltiazem 60 mg PO q6h  - [id number] of 0 so will likely not need long term anticoagulation. Will give a one time dose of enoxaparin 100 mg until more data is obtained. D-dimer mildly elevated; check CTA chest-Neg for PE, b/l effusion with pulmonary edema  - CXR with pulm edema received lasix   cardio f up noted Echocardiogram with severe LV systolic dysfunction, mod/severe MR, mild pulm HTN. In some views, there appears to be segmental wall motion abnormalities.  CTA chest with no PE, small pleural effusionsPlan [facility] EP to see as well.  cardizem stopped and metoprolol increased.     54 y/o male with no pertinent PMHx presents to the ED BIBEMS from Urgent Care c/o SOB x few days. Pt states he started having difficulty breathing ~ 11/23 night.  no chest pains. Pt with persistent mild dyspnea x past ~ 8d. Pt went to Urgent Care today, EKG showed to have rapid heart beat. Per EMS, urgent care treated pt as SVT, pt given 6-12-12 Adenosine with no resolutoin of sx. Pt with  mild palpitations last 2 d. NO cough , congestion, fevers, peripheral edema.he says for last 1 year he had irregular heart beat at times but sought no TT for it no w up done. No known COVID contacts / prior infxn . pt does not smoke or take any illicit drugs. No other complaints at this time. NKDA. PCP: Kvng gunter last seen 8 yrs back  patient states that lately he has started drinking alcohol a lot and takes lot of coffee. lives alone, has a business importing flowers, has one daughter who lives in city  admitted for  ac respiratory distress with   New Onset AFIB with RVR  new onset acute systolic heart failure  elevated LFT transaminitis   acute pulmonary edema  b/l pleural effusion  PE ruled out  started on cardizem  - [id number] of 0 so will likely not need long term anticoagulation. Will give a one time dose of enoxaparin 100 mg until more data is obtained. D-dimer mildly elevated; check CTA chest-Neg for PE, b/l effusion with pulmonary edema  - CXR with pulm edema received lasix   cardio f up noted Echocardiogram with severe LV systolic dysfunction, mod/severe MR, mild pulm HTN. In some views, there appears to be segmental wall motion abnormalities.  CTA chest with no PE, small pleural effusionsPlan [facility] EP to see as well.  cardizem stopped and metoprolol increased.     No Finasteride Pregnancy And Lactation Text: This medication is absolutely contraindicated during pregnancy. It is unknown if it is excreted in breast milk.

## 2024-12-13 NOTE — PRE-ANESTHESIA EVALUATION ADULT - TEMPERATURE IN CELSIUS (DEGREES C)
Subjective   Nemesio Fitzpatrick is a 75 y.o. male.     Chief Complaint   Patient presents with    Hypertension     Htn- no issues on meds    Pt continues to slowly loose weight. Has stage 4 cancer with bony mets and palliative chemo. Pt has redone his will and is getting his affairs in order.  He reports tumors in his head are gone. His lung tumor has shrunk. He is in a cancer support group. He has been told his prognosis is 1-15 years.      Having low back pain and using hydrocodone sparingly. Also has bony mets. Wanting to get meds here.     H/o prostate cancer- had prostate removed, follows with urology and is resolved. They follow psa. He reports he has not seen urology in some time.      Allergies- to mold, claritin helps and he feels this is all he needs.      hld- has never been on meds, diet controlled. [facility] and [facility] good.      Hypothyroidism- no cold intolerance., doing well on meds, no fatigue, [facility].      High bs in the past, [facility]      Insomnia/te- No longer taking ambien, trouble falling asleep. Does great with gabapentin now.               The following portions of the patient's history were reviewed and updated as appropriate: allergies, current medications, past family history, past medical history, past social history, past surgical history and problem list.    Past Medical History:   Diagnosis Date    Acute bronchitis     Allergic Iodine    Allergic rhinitis     Anxiety 2024    Due to sudden cancer diagnosis    Arthritis 2000    BPH (benign prostatic hypertrophy)     Cervical disc disorder 1987    Dislocation, shoulder 1973    Elevated prostate specific antigen (PSA)     Headache 1990    History of bone scan 10/21/2010    normal    History of colonoscopy     never    History of EKG 03/12/2012    also 11-; T wave abnormality    History of medical problems 1978    HL (hearing loss) 2000    Hyperlipidemia     Hypertension 2024    Upon learning 
of Stage 4 cancer    Hypothyroidism     IFG (impaired fasting glucose)     Kidney calculus     left ureteral stone    Knee swelling 2015    Low back pain     Lung cancer     Right    Malignant neoplasm prostate 2009    Dr. Mcclelland; lymph nodes negative margins clear    Prostate nodule     right lobe    Rotator cuff syndrome 2009    Scoliosis 2009    Screening for prostate cancer     prostatectomy    Sleep apnea     CPAP machine    URI (upper respiratory infection)        Past Surgical History:   Procedure Laterality Date    CERVICAL DISC SURGERY      also ; C5-6, C6-7    HAND SURGERY Left     trigger finger release; left middle finger    NECK SURGERY  ,     Cervical disk    PROSTATECTOMY  2009    RHINOPLASTY  1985    ROTATOR CUFF REPAIR      SHOULDER SURGERY Left 2009    Dr. KRISTEL Jimenez; rotator cuff repair; bone spurs     SPINE SURGERY  2005    TRIGGER POINT INJECTION  ,,    URETERAL STENT INSERTION Left 2013    Dr. Martinez       Family History   Problem Relation Age of Onset    Diabetes Mother     Kidney disease Mother         calculus    Hearing loss Mother     Prostate cancer Father     Stomach cancer Father     Cancer Sister         breast    Hypothyroidism Sister     Kidney disease Sister         calculus    Arthritis Sister     Transient ischemic attack Brother     Alzheimer's disease Other         uncle    Diabetes Other         uncle    Arthritis Brother        Social History     Socioeconomic History    Marital status:      Spouse name: Faith   Tobacco Use    Smoking status: Former     Current packs/day: 0.00     Average packs/day: 1 pack/day for 22.0 years (22.0 ttl pk-yrs)     Types: Cigarettes, Pipe     Start date: 1967     Quit date: 1988     Years since quittin.9     Passive exposure: Past    Smokeless tobacco: Never    Tobacco comments:     Quit smoking pipe around    Vaping Use    Vaping status: Never Used 
"  Substance and Sexual Activity    Alcohol use: Yes     Comment: Rarely will drink one glass of wine    Drug use: Never    Sexual activity: Not Currently     Partners: Female       Review of Systems   Constitutional:  Negative for fever.   Respiratory:  Negative for shortness of breath.        Objective   Visit Vitals  /76 (BP Location: Left arm, Patient Position: Sitting)   Pulse 63   Temp 97.9 °F (36.6 °C)   Resp 14   Ht 177.8 cm (70\")   Wt 97 kg (213 lb 12.8 oz)   SpO2 98%   BMI 30.68 kg/m²     Body mass index is 30.68 kg/m².  Physical Exam  Constitutional:       Appearance: Normal appearance. He is well-developed.   Cardiovascular:      Rate and Rhythm: Normal rate and regular rhythm.      Heart sounds: Normal heart sounds.   Pulmonary:      Effort: Pulmonary effort is normal.      Breath sounds: Normal breath sounds.   Musculoskeletal:         General: No swelling. Normal range of motion.   Skin:     General: Skin is warm and dry.      Findings: No rash.   Neurological:      General: No focal deficit present.      Mental Status: He is alert and oriented to person, place, and time.   Psychiatric:         Mood and Affect: Mood normal.         Behavior: Behavior normal.           Assessment & Plan   Diagnoses and all orders for this visit:    1. Primary hypertension (Primary)    2. Seasonal allergic rhinitis due to pollen    3. Mixed hyperlipidemia  -     Comprehensive Metabolic Panel  -     Lipid Panel    4. Acquired hypothyroidism  -     TSH Rfx On Abnormal To Free T4    5. IFG (impaired fasting glucose)  -     Hemoglobin A1c    6. Cancer, metastatic to bone  -     HYDROcodone-acetaminophen (NORCO) 5-325 MG per tablet; Take 1 tablet by mouth Daily As Needed for Moderate Pain.  Dispense: 30 tablet; Refill: 0    7. Sciatica of left side  -     HYDROcodone-acetaminophen (NORCO) 5-325 MG per tablet; Take 1 tablet by mouth Daily As Needed for Moderate Pain.  Dispense: 30 tablet; Refill: 0    8. Primary "
nonsquamous nonsmall cell neoplasm of lung    9. Malignant neoplasm prostate  -     PSA Screen    10. Generalized anxiety disorder    11. Encounter for screening for malignant neoplasm of prostate  -     PSA Screen                   F/u in 3 months. Watson, cont meds.   
36.4
36.9

## 2025-03-13 NOTE — ASU PATIENT PROFILE, ADULT - NS SC CAGE ALCOHOL EYE OPENER
ERP at bedside. Pt agrees with plan of care discussed by ERP. CIDET acknowledged with patient. Henry in low position, side rail up for pt safety. Call light within reach. Will continue to monitor.   no

## 2025-04-21 NOTE — H&P CARDIOLOGY - TIME:
The likely etiology of thrombocytopenia is liver disease. The patients 3 most recent labs are listed below.  Recent Labs     04/19/25  0608 04/20/25  0655 04/21/25  0601   PLT 72* 70* 67*     Plan  - Will transfuse if platelet count is <20k.     14:10